# Patient Record
Sex: FEMALE | Race: WHITE | NOT HISPANIC OR LATINO | Employment: FULL TIME | URBAN - METROPOLITAN AREA
[De-identification: names, ages, dates, MRNs, and addresses within clinical notes are randomized per-mention and may not be internally consistent; named-entity substitution may affect disease eponyms.]

---

## 2017-07-03 ENCOUNTER — LAB REQUISITION (OUTPATIENT)
Dept: LAB | Facility: HOSPITAL | Age: 52
End: 2017-07-03

## 2017-07-03 ENCOUNTER — AMBULATORY SURGICAL CENTER (OUTPATIENT)
Dept: URBAN - METROPOLITAN AREA SURGERY 9 | Facility: SURGERY | Age: 52
End: 2017-07-03
Payer: COMMERCIAL

## 2017-07-03 DIAGNOSIS — K57.30 DIVERTICULOSIS OF LARGE INTESTINE WITHOUT PERFORATION OR ABS: ICD-10-CM

## 2017-07-03 DIAGNOSIS — K29.50 UNSPECIFIED CHRONIC GASTRITIS WITHOUT BLEEDING: ICD-10-CM

## 2017-07-03 DIAGNOSIS — K20.9 ESOPHAGITIS, UNSPECIFIED: ICD-10-CM

## 2017-07-03 DIAGNOSIS — Z80.0 FAMILY HISTORY OF MALIGNANT NEOPLASM OF DIGESTIVE ORGAN: ICD-10-CM

## 2017-07-03 DIAGNOSIS — Z87.19 PERSONAL HISTORY OF OTHER DISEASES OF THE DIGESTIVE SYSTEM: ICD-10-CM

## 2017-07-03 DIAGNOSIS — Z80.0 FAMILY HISTORY OF MALIGNANT NEOPLASM OF DIGESTIVE ORGANS: ICD-10-CM

## 2017-07-03 DIAGNOSIS — Z90.3 ACQUIRED ABSENCE OF STOMACH [PART OF]: ICD-10-CM

## 2017-07-03 PROCEDURE — 43239 EGD BIOPSY SINGLE/MULTIPLE: CPT

## 2017-07-03 PROCEDURE — 45378 DIAGNOSTIC COLONOSCOPY: CPT

## 2017-07-03 PROCEDURE — 88312 SPECIAL STAINS GROUP 1: CPT | Performed by: INTERNAL MEDICINE

## 2017-07-03 PROCEDURE — 88305 TISSUE EXAM BY PATHOLOGIST: CPT | Performed by: INTERNAL MEDICINE

## 2017-07-06 LAB
CYTO UR: NORMAL
LAB AP CASE REPORT: NORMAL
LAB AP CLINICAL INFORMATION: NORMAL
Lab: NORMAL
PATH REPORT.FINAL DX SPEC: NORMAL
PATH REPORT.GROSS SPEC: NORMAL

## 2021-02-12 ENCOUNTER — IMMUNIZATION (OUTPATIENT)
Dept: VACCINE CLINIC | Facility: HOSPITAL | Age: 56
End: 2021-02-12

## 2021-02-12 PROCEDURE — 0001A: CPT | Performed by: INTERNAL MEDICINE

## 2021-02-12 PROCEDURE — 91300 HC SARSCOV02 VAC 30MCG/0.3ML IM: CPT | Performed by: INTERNAL MEDICINE

## 2021-03-05 ENCOUNTER — APPOINTMENT (OUTPATIENT)
Dept: VACCINE CLINIC | Facility: HOSPITAL | Age: 56
End: 2021-03-05

## 2021-03-06 ENCOUNTER — IMMUNIZATION (OUTPATIENT)
Dept: VACCINE CLINIC | Facility: HOSPITAL | Age: 56
End: 2021-03-06

## 2021-03-06 PROCEDURE — 91300 HC SARSCOV02 VAC 30MCG/0.3ML IM: CPT | Performed by: INTERNAL MEDICINE

## 2021-03-06 PROCEDURE — 0002A: CPT | Performed by: INTERNAL MEDICINE

## 2021-09-02 ENCOUNTER — E-VISIT (OUTPATIENT)
Dept: FAMILY MEDICINE CLINIC | Facility: TELEHEALTH | Age: 56
End: 2021-09-02

## 2021-09-02 DIAGNOSIS — Z20.822 EXPOSURE TO COVID-19 VIRUS: Primary | ICD-10-CM

## 2021-09-02 PROCEDURE — 99421 OL DIG E/M SVC 5-10 MIN: CPT | Performed by: NURSE PRACTITIONER

## 2021-09-02 NOTE — PROGRESS NOTES
I reviewed the patient's evisit. Dx exposure to covid-19. I ordered a covid-19 test. I spent 5-10 minutes in the patient's chart for this e-visit.

## 2021-12-01 ENCOUNTER — IMMUNIZATION (OUTPATIENT)
Dept: VACCINE CLINIC | Facility: HOSPITAL | Age: 56
End: 2021-12-01

## 2021-12-01 PROCEDURE — 91300 HC SARSCOV02 VAC 30MCG/0.3ML IM: CPT | Performed by: INTERNAL MEDICINE

## 2021-12-01 PROCEDURE — 0004A HC ADM SARSCOV2 30MCG/0.3ML BOOSTER: CPT | Performed by: INTERNAL MEDICINE

## 2023-12-05 ENCOUNTER — HOSPITAL ENCOUNTER (OUTPATIENT)
Dept: GENERAL RADIOLOGY | Facility: HOSPITAL | Age: 58
Discharge: HOME | End: 2023-12-05
Admitting: NURSE PRACTITIONER
Payer: COMMERCIAL

## 2023-12-05 ENCOUNTER — OFFICE VISIT (OUTPATIENT)
Dept: INTERNAL MEDICINE | Facility: CLINIC | Age: 58
End: 2023-12-05
Payer: COMMERCIAL

## 2023-12-05 VITALS
OXYGEN SATURATION: 97 % | TEMPERATURE: 98.2 F | BODY MASS INDEX: 38.39 KG/M2 | DIASTOLIC BLOOD PRESSURE: 80 MMHG | WEIGHT: 230.4 LBS | HEART RATE: 96 BPM | SYSTOLIC BLOOD PRESSURE: 110 MMHG | HEIGHT: 65 IN

## 2023-12-05 DIAGNOSIS — Z12.11 ENCOUNTER FOR SCREENING COLONOSCOPY: ICD-10-CM

## 2023-12-05 DIAGNOSIS — Z12.31 ENCOUNTER FOR SCREENING MAMMOGRAM FOR MALIGNANT NEOPLASM OF BREAST: ICD-10-CM

## 2023-12-05 DIAGNOSIS — R00.2 HEART PALPITATIONS: ICD-10-CM

## 2023-12-05 DIAGNOSIS — E78.5 HYPERLIPIDEMIA LDL GOAL <100: ICD-10-CM

## 2023-12-05 DIAGNOSIS — Z90.3 H/O GASTRIC SLEEVE: ICD-10-CM

## 2023-12-05 DIAGNOSIS — M25.512 ACUTE PAIN OF LEFT SHOULDER: Primary | ICD-10-CM

## 2023-12-05 DIAGNOSIS — Z98.890 HISTORY OF COLONOSCOPY WITH POLYPECTOMY: ICD-10-CM

## 2023-12-05 DIAGNOSIS — Z11.59 NEED FOR HEPATITIS C SCREENING TEST: ICD-10-CM

## 2023-12-05 DIAGNOSIS — K21.00 GASTROESOPHAGEAL REFLUX DISEASE WITH ESOPHAGITIS WITHOUT HEMORRHAGE: ICD-10-CM

## 2023-12-05 DIAGNOSIS — Z86.010 HISTORY OF COLONOSCOPY WITH POLYPECTOMY: ICD-10-CM

## 2023-12-05 DIAGNOSIS — M25.512 ACUTE PAIN OF LEFT SHOULDER: ICD-10-CM

## 2023-12-05 PROBLEM — Z86.0100 HISTORY OF COLONOSCOPY WITH POLYPECTOMY: Status: ACTIVE | Noted: 2023-12-05

## 2023-12-05 PROBLEM — K21.9 GASTROESOPHAGEAL REFLUX DISEASE: Status: ACTIVE | Noted: 2023-12-05

## 2023-12-05 PROCEDURE — 73030 X-RAY EXAM OF SHOULDER: CPT

## 2023-12-05 RX ORDER — DIPHENOXYLATE HYDROCHLORIDE AND ATROPINE SULFATE 2.5; .025 MG/1; MG/1
TABLET ORAL DAILY
COMMUNITY

## 2023-12-05 RX ORDER — ESOMEPRAZOLE MAGNESIUM 40 MG/1
40 CAPSULE, DELAYED RELEASE ORAL
Qty: 90 CAPSULE | Refills: 1 | Status: SHIPPED | OUTPATIENT
Start: 2023-12-05

## 2023-12-05 RX ORDER — ESOMEPRAZOLE MAGNESIUM 40 MG/1
40 CAPSULE, DELAYED RELEASE ORAL
COMMUNITY
Start: 2016-01-04 | End: 2023-12-05 | Stop reason: SDUPTHER

## 2023-12-05 NOTE — PROGRESS NOTES
"Chief Complaint   Patient presents with    Establish Care    Shoulder Pain     Discomfort in left shoulder       Subjective     Ashlyn Gil is a 58 y.o. female being seen for a re-establish care appointment to discuss palpitations, left shoulder pain, and GERD. She was last seen here in 2014. She has hx of GERD. She had a gastric sleeve procedure in 12- with Dr. Burleson. She has been taking OTC omeprazole 20mg due to running out of nexium, as she is due an EGD and coloscopy with Dr. Alonso.    She is having left shoulder pain for 4 weeks.Described as burning pain, worse with lifting. Radiating to left breast. No known injury, but recently decorated for the holidays and lifted boxes.She is taking Tylenol as needed.     She has felt palpitations, like fluttering of heart for the past 42 weeks. She captured an ECG on her watch. Described as a \"whoosh\" feeling.Denies SOA, CP. This occurs a couple times a week.     History of Present Illness     No Known Allergies      Current Outpatient Medications:     esomeprazole (nexIUM) 40 MG capsule, Take 1 capsule by mouth., Disp: , Rfl:     multivitamin (MULTIVITAMIN PO), Take  by mouth Daily., Disp: , Rfl:     The following portions of the patient's history were reviewed and updated as appropriate: allergies, current medications, past family history, past medical history, past social history, past surgical history, and problem list.    Review of Systems   Constitutional: Negative.    HENT: Negative.     Eyes: Negative.    Respiratory: Negative.     Cardiovascular: Negative.  Negative for chest pain, palpitations and leg swelling.   Gastrointestinal: Negative.    Endocrine: Negative.    Genitourinary: Negative.    Musculoskeletal:  Positive for arthralgias.   Allergic/Immunologic: Negative.  Negative for environmental allergies.   Neurological: Negative.    Hematological: Negative.  Negative for adenopathy. Does not bruise/bleed easily.   Psychiatric/Behavioral: " Negative.     All other systems reviewed and are negative.      Assessment     Physical Exam  Vitals reviewed.   Constitutional:       Appearance: Normal appearance.   HENT:      Head: Normocephalic.      Right Ear: Tympanic membrane normal.   Cardiovascular:      Rate and Rhythm: Normal rate and regular rhythm.      Pulses: Normal pulses.      Heart sounds: Normal heart sounds. No murmur heard.  Pulmonary:      Effort: Pulmonary effort is normal. No respiratory distress.      Breath sounds: Normal breath sounds. No stridor.   Musculoskeletal:      Left shoulder: Crepitus present. No tenderness. Normal range of motion. Normal strength. Normal pulse.      Comments: Pain left shoulder with internal and external rotation   Neurological:      General: No focal deficit present.      Mental Status: She is alert and oriented to person, place, and time.      Gait: Gait normal.   Psychiatric:         Mood and Affect: Mood normal.         Behavior: Behavior normal.         Thought Content: Thought content normal.         Plan     Her ECG tracings reviewed from SmartWatch, no evidence of A fib.    Diagnoses and all orders for this visit:    1. Acute pain of left shoulder (Primary)  -     XR Shoulder 2+ View Left; Future  -     Ambulatory Referral to Physical Therapy Evaluate and treat    2. Gastroesophageal reflux disease with esophagitis without hemorrhage  -     Ambulatory Referral to Gastroenterology  -     CBC & Differential  -     Comprehensive Metabolic Panel  -     esomeprazole (nexIUM) 40 MG capsule; Take 1 capsule by mouth Every Morning Before Breakfast.  Dispense: 90 capsule; Refill: 1    3. Hyperlipidemia LDL goal <100  -     CBC & Differential  -     Comprehensive Metabolic Panel  -     Lipid Panel With / Chol / HDL Ratio    4. Heart palpitations  -     Lipid Panel With / Chol / HDL Ratio  -     TSH    5. H/O gastric sleeve  -     Vitamin B12  -     Vitamin B1, Whole Blood; Future  -     Ferritin    6. History of  colonoscopy with polypectomy  -     Ambulatory Referral to Gastroenterology    7. Encounter for screening colonoscopy  -     Ambulatory Referral to Gastroenterology    8. Encounter for screening mammogram for malignant neoplasm of breast  -     Mammo Screening Digital Tomosynthesis Bilateral With CAD; Future    9. Need for hepatitis C screening test  -     Hepatitis C antibody        Follow up in 4 weeks for CPE and PAP

## 2023-12-06 DIAGNOSIS — R73.01 IFG (IMPAIRED FASTING GLUCOSE): Primary | ICD-10-CM

## 2023-12-06 LAB
ALBUMIN SERPL-MCNC: 4.7 G/DL (ref 3.5–5.2)
ALBUMIN/GLOB SERPL: 1.7 G/DL
ALP SERPL-CCNC: 69 U/L (ref 39–117)
ALT SERPL-CCNC: 24 U/L (ref 1–33)
AST SERPL-CCNC: 22 U/L (ref 1–32)
BASOPHILS # BLD AUTO: 0.02 10*3/MM3 (ref 0–0.2)
BASOPHILS NFR BLD AUTO: 0.3 % (ref 0–1.5)
BILIRUB SERPL-MCNC: 0.4 MG/DL (ref 0–1.2)
BUN SERPL-MCNC: 15 MG/DL (ref 8–23)
BUN/CREAT SERPL: 19.2 (ref 7–25)
CALCIUM SERPL-MCNC: 9.9 MG/DL (ref 8.6–10.5)
CHLORIDE SERPL-SCNC: 102 MMOL/L (ref 98–107)
CHOLEST SERPL-MCNC: 256 MG/DL (ref 0–200)
CHOLEST/HDLC SERPL: 2.75 {RATIO}
CO2 SERPL-SCNC: 23.5 MMOL/L (ref 22–29)
CREAT SERPL-MCNC: 0.78 MG/DL (ref 0.57–1)
EGFRCR SERPLBLD CKD-EPI 2021: 87.1 ML/MIN/1.73
EOSINOPHIL # BLD AUTO: 0.08 10*3/MM3 (ref 0–0.4)
EOSINOPHIL NFR BLD AUTO: 1.2 % (ref 0.3–6.2)
ERYTHROCYTE [DISTWIDTH] IN BLOOD BY AUTOMATED COUNT: 12.4 % (ref 12.3–15.4)
FERRITIN SERPL-MCNC: 46.3 NG/ML (ref 13–150)
GLOBULIN SER CALC-MCNC: 2.7 GM/DL
GLUCOSE SERPL-MCNC: 112 MG/DL (ref 65–99)
HCT VFR BLD AUTO: 43.5 % (ref 34–46.6)
HCV IGG SERPL QL IA: NON REACTIVE
HDLC SERPL-MCNC: 93 MG/DL (ref 40–60)
HGB BLD-MCNC: 14 G/DL (ref 12–15.9)
IMM GRANULOCYTES # BLD AUTO: 0.02 10*3/MM3 (ref 0–0.05)
IMM GRANULOCYTES NFR BLD AUTO: 0.3 % (ref 0–0.5)
LDLC SERPL CALC-MCNC: 151 MG/DL (ref 0–100)
LYMPHOCYTES # BLD AUTO: 2.61 10*3/MM3 (ref 0.7–3.1)
LYMPHOCYTES NFR BLD AUTO: 39.7 % (ref 19.6–45.3)
MCH RBC QN AUTO: 26.4 PG (ref 26.6–33)
MCHC RBC AUTO-ENTMCNC: 32.2 G/DL (ref 31.5–35.7)
MCV RBC AUTO: 81.9 FL (ref 79–97)
MONOCYTES # BLD AUTO: 0.45 10*3/MM3 (ref 0.1–0.9)
MONOCYTES NFR BLD AUTO: 6.8 % (ref 5–12)
NEUTROPHILS # BLD AUTO: 3.39 10*3/MM3 (ref 1.7–7)
NEUTROPHILS NFR BLD AUTO: 51.7 % (ref 42.7–76)
NRBC BLD AUTO-RTO: 0.2 /100 WBC (ref 0–0.2)
PLATELET # BLD AUTO: 307 10*3/MM3 (ref 140–450)
POTASSIUM SERPL-SCNC: 4.1 MMOL/L (ref 3.5–5.2)
PROT SERPL-MCNC: 7.4 G/DL (ref 6–8.5)
RBC # BLD AUTO: 5.31 10*6/MM3 (ref 3.77–5.28)
SODIUM SERPL-SCNC: 140 MMOL/L (ref 136–145)
TRIGL SERPL-MCNC: 73 MG/DL (ref 0–150)
TSH SERPL DL<=0.005 MIU/L-ACNC: 2.13 UIU/ML (ref 0.27–4.2)
VIT B12 SERPL-MCNC: 658 PG/ML (ref 211–946)
VLDLC SERPL CALC-MCNC: 12 MG/DL (ref 5–40)
WBC # BLD AUTO: 6.57 10*3/MM3 (ref 3.4–10.8)

## 2023-12-07 ENCOUNTER — PATIENT ROUNDING (BHMG ONLY) (OUTPATIENT)
Dept: INTERNAL MEDICINE | Facility: CLINIC | Age: 58
End: 2023-12-07
Payer: COMMERCIAL

## 2023-12-07 ENCOUNTER — TELEPHONE (OUTPATIENT)
Dept: INTERNAL MEDICINE | Facility: CLINIC | Age: 58
End: 2023-12-07
Payer: COMMERCIAL

## 2023-12-07 LAB
HBA1C MFR BLD: 5.6 % (ref 4.8–5.6)
WRITTEN AUTHORIZATION: NORMAL

## 2023-12-07 NOTE — TELEPHONE ENCOUNTER
Lvm to call back    Relay  Labs do not show any anemia or leukemias She is not a hep C carrier. Fasting sugar is 112. Total cholesterol 256, < 200 is normal and , goal < 100. I would like to discuss a weight loss medicine with her at physical to help reduce her risk for DM and CAD. Please have her check insurance benefit for Wegovy and Zepbound.

## 2023-12-07 NOTE — PROGRESS NOTES
My name is Rebecca Jarrett and I am the Patient  at [facility].    I would like to officially welcome you to our practice and ask about your recent visit.    Tell me about your visit with us. What things went well?        We're always looking for ways to make our patients' experiences even better. Do you have recommendations on ways we may improve?     Overall were you satisfied with your first visit to our practice?       I appreciate you taking the time to answer these questions. Is there anything else I can do for you?      Thank you, and have a great day.    Rebecca

## 2023-12-07 NOTE — TELEPHONE ENCOUNTER
Name: Ashlyn Gil    Relationship: Self    Best Callback Number: [phone]    HUB PROVIDED THE RELAY MESSAGE FROM THE OFFICE   PATIENT VOICED UNDERSTANDING AND HAS NO FURTHER QUESTIONS AT THIS TIME    ADDITIONAL INFORMATION: STATED THAT THEY WILL REACH OUT TO INSURANCE. STATED THAT THEY NOTICED AN A1C ON THEIR MYCHART AND WANTED TO MAKE SURE IF THEY SHOULD GET THIS NOW OR WAIT TIL THE PHYSICAL. PLEASE CALL AND ADVISE

## 2023-12-18 ENCOUNTER — HOSPITAL ENCOUNTER (OUTPATIENT)
Dept: PHYSICAL THERAPY | Facility: HOSPITAL | Age: 58
Setting detail: THERAPIES SERIES
Discharge: HOME | End: 2023-12-18
Payer: COMMERCIAL

## 2023-12-18 DIAGNOSIS — M25.512 ACUTE PAIN OF LEFT SHOULDER: Primary | ICD-10-CM

## 2023-12-18 PROCEDURE — 97161 PT EVAL LOW COMPLEX 20 MIN: CPT | Performed by: PHYSICAL THERAPIST

## 2023-12-19 NOTE — THERAPY EVALUATION
Outpatient Physical Therapy Ortho Initial Evaluation  NABIL Natarajan     Patient Name: Ashlyn Gil  : 1965  MRN: [id number]  Today's Date: 2023      Visit Date: 2023    Patient Active Problem List   Diagnosis    Gastroesophageal reflux disease    Total, mature senile cataract    Hyperlipidemia LDL goal <100    History of colonoscopy with polypectomy    Encounter for screening mammogram for malignant neoplasm of breast    Acute pain of left shoulder    Need for hepatitis C screening test        Past Medical History:   Diagnosis Date    Anxiety     Carpal tunnel syndrome     Depression     Diabetes mellitus, type 2     Diarrhea     Edema     Esophageal reflux     Esophagitis, reflux     Fatigue     Heartburn     Hemorrhoids     Hiatal hernia     History of bone density study 2010    Normal    History of colonoscopy 2012    Normal    History of esophagogastroduodenoscopy 2012    Olman Esophagus    History of mammogram 2012    Normal    Hypercholesterolemia     Hyperlipidemia     Insomnia     Malnutrition     Morbid obesity     Pain in soft tissues of limb     Knee    Plantar fasciitis     Poor vision     Prediabetes     Restless legs syndrome     Right upper quadrant pain     Sciatica     Sleep apnea     Wears glasses     Wrist joint pain     Bilateral        Past Surgical History:   Procedure Laterality Date    BILATERAL BREAST REDUCTION  2010    CHOLECYSTECTOMY  2012    EYE SURGERY      GASTRIC RESTRICTION SURGERY  2013    VGS, VERTICAL GASTRIC SLEEVE    HERNIA REPAIR  2013    Paraesophageal Hiatus Hernia    LEG SURGERY  2010    RIGHT LEG, ROSI AND SCREWS    PAP SMEAR  2012    Normal    TONSILLECTOMY AND ADENOIDECTOMY  1970    TUBAL ABDOMINAL LIGATION  1995       Visit Dx:     ICD-10-CM ICD-9-CM   1. Acute pain of left shoulder  M25.512 719.41          Patient History       Row Name 23 0700             History    Chief Complaint Muscle  tenderness;Numbness;Pain  -GC      Type of Pain Shoulder pain;Upper Extremity / Arm  -GC      Date Current Problem(s) Began 12/04/23  -GC      Brief Description of Current Complaint Pt reports a severl wekk hsitory of left shoulder and left UE pain, numbness, tingling with no specific injury. She denies any neck pain or issues and denies any previous shoulder or neck problems.She was seen by MARVA Haddad who did x-rays which were negative. She has been referred for therapy.  -GC      Patient/Caregiver Goals Relieve pain  -GC      Hand Dominance right-handed  -GC      Occupation/sports/leisure activities Walking  -      What clinical tests have you had for this problem? X-ray  -      Results of Clinical Tests negative  -GC      Are you or can you be pregnant No  -GC         Pain     Pain Location Arm;Shoulder  left shoulder an UE  -GC      Pain at Present 3  -GC      Pain at Worst 6  -GC      Pain Frequency Constant/continuous  -GC      Pain Description Aching;Burning;Discomfort;Tingling;Tightness;Numbness  -      What Performance Factors Make the Current Problem(s) WORSE? Pt c/o pain when lyning on her left side  -GC         Fall Risk Assessment    Any falls in the past year: No  -GC         Services    Prior Rehab/Home Health Experiences No  -GC      Are you currently receiving Home Health services No  -GC      Do you plan to receive Home Health services in the near future No  -GC         Daily Activities    Primary Language English  -GC      Are you able to read Yes  -GC      Are you able to write Yes  -GC      How does patient learn best? Listening;Reading;Demonstration  -GC      Teaching needs identified Home Exercise Program;Management of Condition  -GC      Patient is concerned about/has problems with Performing home management (household chores, shopping, care of dependents);Performing job responsibilities/community activities (work, school,;Repetitive movements of the hand, arm, shoulder  -GC       Does patient have problems with the following? Anxiety  -GC      Barriers to learning None  -GC      Pt Participated in POC and Goals Yes  -GC         Safety    Are you being hurt, hit, or frightened by anyone at home or in your life? No  -GC      Are you being neglected by a caregiver No  -GC      Have you had any of the following issues with N/A  -GC                User Key  (r) = Recorded By, (t) = Taken By, (c) = Cosigned By      Initials Name Provider Type    GC Immanuel Briggs PT Physical Therapist                     PT Ortho       Row Name 12/18/23 0700       Posture/Observations    Posture/Observations Comments Pt has mild rounded shoulders and slight forward head  -GC       Neural Tension Signs- Upper Quarter Clearing    ULNTT 1 Left:;Postive  -GC    ULNTT 2 Left:;Postive  -GC    ULNTT 3 Left:;Postive  -GC    ULNTT 4 Left:;Postive  -GC       Cervical Palpation    Levator Scapula Left:;Tender  -GC    Upper Traps Left:;Tender  -GC       Rib Mobility    Rib Mobility- T1 Left:;Hypomobile  elevated 1st rib left  -GC       Shoulder Girdle Accessory Motions    Posterior glide of humerus Left:;WNL  -GC    Anterior glide of humerus Left:;WNL  -GC    Inferior glide of humerus Left:;WNL  -GC       Shoulder Impingement/Rotator Cuff Special Tests    Duenas-Karl Test (RC Lesion vs. Bursitis) Left:;Negative  -GC    Neer Impingement Test (RC Lesion vs. Bursitis) Left:;Negative  -GC    Drop Arm Test (Full Thickness RC Lesion) Left:;Negative  -GC       Shoulder Laxity/Instability Special Tests    Load and Shift Test Left:;Negative  -GC    Sulcus Sign, 0 Degrees Left:;Negative  -GC       Biceps/Labral Special Tests    [place]'s Test (Labral Test) Left:;Negative  -GC       Left Upper Ext    Lt Shoulder Abduction AROM 160 degrees  -GC    Lt Shoulder Flexion AROM 149 degrees  -GC    Lt Shoulder External Rotation AROM WFL  -GC    Lt Shoulder Internal Rotation AROM 65 degrees  -GC       MMT (Manual Muscle Testing)    General  MMT Comments scaption strength is 4+/5  -       MMT Left Upper Ext    Lt Shoulder Flexion MMT, Gross Movement (4+/5) good plus  -GC    Lt Shoulder Extension MMT, Gross Movement (5/5) normal  -GC    Lt Shoulder ABduction MMT, Gross Movement (4+/5) good plus  -GC    Lt Shoulder ADduction MMT, Gross Movement (5/5) normal  -GC    Lt Shoulder Internal Rotation MMT, Gross Movement (5/5) normal  -GC    Lt Shoulder External Rotation MMT, Gross Movement (4+/5) good plus  -GC       Sensation    Light Touch No apparent deficits  -GC       Upper Extremity Flexibility    Scalenes Left:;Mildly limited  -GC    SCM Left:;WNL  -GC    Upper Trapezius Left:;Mildly limited  -GC    Levator Scapula Left:;Mildly limited  -GC              User Key  (r) = Recorded By, (t) = Taken By, (c) = Cosigned By      Initials Name Provider Type    Immanuel Roberts, PT Physical Therapist                                Therapy Education  Given: HEP, Symptoms/condition management, Pain management, Posture/body mechanics  Program: New  How Provided: Verbal, Demonstration, Written  Provided to: Patient  Level of Understanding: Teach back education performed, Verbalized, Demonstrated      PT OP Goals       Row Name 12/18/23 0700          PT Short Term Goals    STG Date to Achieve 01/01/24  -     STG 1 Decrease left shoulder pain to 2-3/10 with activity.  -     STG 2 Increase left shoulder AROM to WFL all planes with testing.  -     STG 3 Improve left first rib position to WNL with testing.  -     STG 4 Pt will be independent with her HEP issued by this therapist.  -        Long Term Goals    LTG Date to Achieve 01/15/24  -     LTG 1 Decrease left shoulder pain to 0-1/10 with activity.  -     LTG 2 Increase upper trap, levator, and scalene flexibility to WFL with testing.  -     LTG 3 Increase left shoulder girdle strength to 5/5 all planes with testing.  -     LTG 4 Pt will be independent with all ADLs without pain.  -        Time  Calculation    PT Goal Re-Cert Due Date 01/15/24  -               User Key  (r) = Recorded By, (t) = Taken By, (c) = Cosigned By      Initials Name Provider Type     Immanuel Briggs, PT Physical Therapist                     PT Assessment/Plan       Row Name 12/18/23 0700          PT Assessment    Functional Limitations Limitation in home management;Limitations in community activities;Limitations in functional capacity and performance;Performance in leisure activities  -     Impairments Range of motion;Pain;Muscle strength;Joint mobility  -     Assessment Comments Pt presents with a several week history of left shoulder pain with left UE numbness and tingling. She rates her pain up to 6/10 with activity. She has positive upper limb neural tension tests and positive thoracic outlet tests with an elevated first rib. she has decreased left shoulder ROM, decreased left shoulder girdle strength, and decreased function secondary to the above.  -     Rehab Potential Good  -     Patient/caregiver participated in establishment of treatment plan and goals Yes  -     Patient would benefit from skilled therapy intervention Yes  -        PT Plan    PT Frequency 1x/week;2x/week  -     Predicted Duration of Therapy Intervention (PT) 4 weeks  -     Planned CPT's? PT EVAL LOW COMPLEXITY: 29710;PT THER PROC EA 15 MIN: 50608;PT MANUAL THERAPY EA 15 MIN: 17430;PT HOT OR COLD PACK TREAT MCARE;PT TRACTION CERVICAL: 64848  -     PT Plan Comments Pt is to continue her HEP 2x daily  -               User Key  (r) = Recorded By, (t) = Taken By, (c) = Cosigned By      Initials Name Provider Type     Immanuel Briggs, PT Physical Therapist                     Modalities       Row Name 12/18/23 0700             Moist Heat    MH Applied Yes  -      Location left shoulder with pt supine and roll under knees  -      PT Moist Heat Minutes 10  -GC      MH Prior to Rx Yes  -                User Key  (r) = Recorded By, (t)  = Taken By, (c) = Cosigned By      Initials Name Provider Type    GC Immanuel Briggs, PT Physical Therapist                   OP Exercises       Row Name 12/18/23 0700             Exercise 1    Exercise Name 1 Left upper trap stretch  -GC      Reps 1 10  -GC      Time 1 10 secs  -GC         Exercise 2    Exercise Name 2 Left levator stretch  -GC      Reps 2 10  -GC      Time 2 10 secs  -GC         Exercise 3    Exercise Name 3 Left scalene stretch  -GC      Reps 3 10  -GC      Time 3 10 secs  -GC         Exercise 4    Exercise Name 4 1st rib mobilizations  -GC      Reps 4 15x  -GC                User Key  (r) = Recorded By, (t) = Taken By, (c) = Cosigned By      Initials Name Provider Type    GC Immanuel Briggs, PT Physical Therapist                  Manual Rx (last 36 hours)       Manual Treatments       Row Name 12/18/23 0700             Manual Rx 1    Manual Rx 1 Location cervical spine  -GC      Manual Rx 1 Type left upper trap stretch  -GC      Manual Rx 1 Duration 3x30 secs  -GC         Manual Rx 2    Manual Rx 2 Location cervical spine  -GC      Manual Rx 2 Type left levator stretch  -GC      Manual Rx 2 Duration 3x30 secs  -GC         Manual Rx 3    Manual Rx 3 Location cervical spine  -GC      Manual Rx 3 Type left scalene stretch  -GC      Manual Rx 3 Duration 3x30 secs  -GC         Manual Rx 4    Manual Rx 4 Location cervical spine  -GC      Manual Rx 4 Type 1st rib mobilization  -GC      Manual Rx 4 Duration 5 min  -GC                User Key  (r) = Recorded By, (t) = Taken By, (c) = Cosigned By      Initials Name Provider Type    GC Immanuel Briggs, PT Physical Therapist                                Outcome Measure Options: Quick DASH  Quick DASH  Open a tight or new jar.: No Difficulty  Do heavy household chores (e.g., wash walls, wash floors): No Difficulty  Carry a shopping bag or briefcase: No Difficulty  Wash your back: Mild Difficulty  Use a knife to cut food: No Difficulty  Recreational activities  in which you take some force or impact through your arm, should or hand (e.g. golf, hammering, tennis, etc.): Mild Difficulty  During the past week, to what extent has your arm, shoulder, or hand problem interfered with your normal social activites with family, friends, neighbors or groups?: Slightly  During the past week, were you limited in your work or other regular daily activities as a result of your arm, shoulder or hand problem?: Slightly Limited  Tingling (pins and needles) in your arm, shoulder, or hand: Mild  During the past week, how much difficulty have you had sleeping because of the pain in your arm, shoulder or hand?: Mild Difficulty  Number of Questions Answered: 10  Quick DASH Score: 15         Time Calculation:     Start Time: 0700  Stop Time: 0800  Time Calculation (min): 60 min  Untimed Charges  PT Moist Heat Minutes: 10  Total Minutes  Untimed Charges Total Minutes: 10   Total Minutes: 10     Therapy Charges for Today       Code Description Service Date Service Provider Modifiers Qty    38832627066 HC PT EVAL LOW COMPLEXITY 3 12/18/2023 Immanuel Briggs, PT GP 1            PT G-Codes  Outcome Measure Options: Quick DASH  Quick DASH Score: 15         Immanuel Briggs, PT  12/19/2023

## 2023-12-22 ENCOUNTER — HOSPITAL ENCOUNTER (OUTPATIENT)
Dept: PHYSICAL THERAPY | Facility: HOSPITAL | Age: 58
Setting detail: THERAPIES SERIES
Discharge: HOME | End: 2023-12-22
Payer: COMMERCIAL

## 2023-12-22 DIAGNOSIS — M25.512 ACUTE PAIN OF LEFT SHOULDER: Primary | ICD-10-CM

## 2023-12-22 PROCEDURE — 97110 THERAPEUTIC EXERCISES: CPT | Performed by: PHYSICAL THERAPIST

## 2023-12-22 NOTE — THERAPY TREATMENT NOTE
Outpatient Physical Therapy Ortho Treatment Note  NABIL Natarajan     Patient Name: Ashlyn Gil  : 1965  MRN: [id number]  Today's Date: 2023      Visit Date: 2023    Visit Dx:    ICD-10-CM ICD-9-CM   1. Acute pain of left shoulder  M25.512 719.41       Patient Active Problem List   Diagnosis    Gastroesophageal reflux disease    Total, mature senile cataract    Hyperlipidemia LDL goal <100    History of colonoscopy with polypectomy    Encounter for screening mammogram for malignant neoplasm of breast    Acute pain of left shoulder    Need for hepatitis C screening test        Past Medical History:   Diagnosis Date    Anxiety     Carpal tunnel syndrome     Depression     Diabetes mellitus, type 2     Diarrhea     Edema     Esophageal reflux     Esophagitis, reflux     Fatigue     Heartburn     Hemorrhoids     Hiatal hernia     History of bone density study     Normal    History of colonoscopy 2012    Normal    History of esophagogastroduodenoscopy 2012    Olman Esophagus    History of mammogram 2012    Normal    Hypercholesterolemia     Hyperlipidemia     Insomnia     Malnutrition     Morbid obesity     Pain in soft tissues of limb     Knee    Plantar fasciitis     Poor vision     Prediabetes     Restless legs syndrome     Right upper quadrant pain     Sciatica     Sleep apnea     Wears glasses     Wrist joint pain     Bilateral        Past Surgical History:   Procedure Laterality Date    BILATERAL BREAST REDUCTION  2010    CHOLECYSTECTOMY  2012    EYE SURGERY      GASTRIC RESTRICTION SURGERY  2013    VGS, VERTICAL GASTRIC SLEEVE    HERNIA REPAIR  2013    Paraesophageal Hiatus Hernia    LEG SURGERY  2010    RIGHT LEG, ROSI AND SCREWS    PAP SMEAR  2012    Normal    TONSILLECTOMY AND ADENOIDECTOMY  1970    TUBAL ABDOMINAL LIGATION  1995                        PT Assessment/Plan       Row Name 23 0700          PT Assessment    Assessment  Comments Pt is doing well with decreasd c/o pain and improved function.  -GC        PT Plan    PT Plan Comments Pt is to continue her HEP 2x daily.  -GC               User Key  (r) = Recorded By, (t) = Taken By, (c) = Cosigned By      Initials Name Provider Type    Immanuel Roberts PT Physical Therapist                     Modalities       Row Name 12/22/23 0700             Subjective    Subjective Comments Pt states the symptoms in her left arm are much better already.  -GC         Moist Heat    MH Applied Yes  -GC      Location left shoulder with pt supine and roll under knees  -GC      PT Moist Heat Minutes 10  -GC      MH Prior to Rx Yes  -GC         Functional Testing    Outcome Measure Options Quick DASH  -GC                User Key  (r) = Recorded By, (t) = Taken By, (c) = Cosigned By      Initials Name Provider Type    Immanuel Roberts, PT Physical Therapist                   OP Exercises       Row Name 12/22/23 0700             Subjective    Subjective Comments Pt states the symptoms in her left arm are much better already.  -GC         Exercise 1    Exercise Name 1 Left upper trap stretch  -GC      Reps 1 10  -GC      Time 1 10 secs  -GC         Exercise 2    Exercise Name 2 Left levator stretch  -GC      Reps 2 10  -GC      Time 2 10 secs  -GC         Exercise 3    Exercise Name 3 Left scalene stretch  -GC      Reps 3 10  -GC      Time 3 10 secs  -GC         Exercise 4    Exercise Name 4 1st rib mobilizations  -GC      Reps 4 15x  -GC         Exercise 5    Exercise Name 5 ULNT mobilizations fo r all 4 biases  -GC      Time 5 5 min  -GC                User Key  (r) = Recorded By, (t) = Taken By, (c) = Cosigned By      Initials Name Provider Type    GC Immanuel Briggs, PT Physical Therapist                                          Outcome Measure Options: Quick DASH         Time Calculation:   Start Time: 0700  Stop Time: 0731  Time Calculation (min): 31 min  Untimed Charges  PT Moist Heat Minutes:  10  Total Minutes  Untimed Charges Total Minutes: 10   Total Minutes: 10  Therapy Charges for Today       Code Description Service Date Service Provider Modifiers Qty    75843292753 HC PT THER PROC EA 15 MIN 12/22/2023 Immanuel Briggs, PT GP 1            PT G-Codes  Outcome Measure Options: Rupinder Briggs, PT  12/22/2023

## 2023-12-26 ENCOUNTER — APPOINTMENT (OUTPATIENT)
Dept: PHYSICAL THERAPY | Facility: HOSPITAL | Age: 58
End: 2023-12-26
Payer: COMMERCIAL

## 2024-01-03 ENCOUNTER — OFFICE VISIT (OUTPATIENT)
Dept: INTERNAL MEDICINE | Facility: CLINIC | Age: 59
End: 2024-01-03
Payer: COMMERCIAL

## 2024-01-03 VITALS
TEMPERATURE: 98 F | WEIGHT: 232.6 LBS | OXYGEN SATURATION: 99 % | HEIGHT: 65 IN | SYSTOLIC BLOOD PRESSURE: 120 MMHG | DIASTOLIC BLOOD PRESSURE: 76 MMHG | HEART RATE: 86 BPM | BODY MASS INDEX: 38.75 KG/M2

## 2024-01-03 DIAGNOSIS — Z12.4 PAP SMEAR FOR CERVICAL CANCER SCREENING: ICD-10-CM

## 2024-01-03 DIAGNOSIS — E78.5 HYPERLIPIDEMIA LDL GOAL <100: ICD-10-CM

## 2024-01-03 DIAGNOSIS — Z00.00 ANNUAL PHYSICAL EXAM: Primary | ICD-10-CM

## 2024-01-03 DIAGNOSIS — F13.982 ANXIOLYTIC-INDUCED INSOMNIA: ICD-10-CM

## 2024-01-03 DIAGNOSIS — E66.01 MORBID (SEVERE) OBESITY DUE TO EXCESS CALORIES: ICD-10-CM

## 2024-01-03 RX ORDER — SEMAGLUTIDE 0.25 MG/.5ML
0.25 INJECTION, SOLUTION SUBCUTANEOUS WEEKLY
Qty: 2 ML | Refills: 1 | Status: SHIPPED | OUTPATIENT
Start: 2024-01-03

## 2024-01-03 RX ORDER — HYDROXYZINE HYDROCHLORIDE 10 MG/1
10 TABLET, FILM COATED ORAL NIGHTLY PRN
Qty: 30 TABLET | Refills: 0 | Status: SHIPPED | OUTPATIENT
Start: 2024-01-03

## 2024-01-03 NOTE — PROGRESS NOTES
Annual Exam        Ashlyn Gil is being seen for a Complete physical exam. Her last physical was unknown.     Social: She is was  October 2021 She is working full time in IT for [employer].    Lifestyle: She does not and never used tobacco. She drinks 1 alcoholic drinks per week. She exercises 0 times a week.    Screening: Colonoscopy was completed never, scheduled for GI tomorrow, and will get colonoscopy and EGD. Last labs reviewed from 12-5-2023.      Reproductive Health: Her Last Pap smear was unknown. Last menstrual period was early 40s, exact unknown. P 3  DEXA scan complete never. Last Mammogram was was > 1 year ago, schedule tomorrow    Dental exam is up to date, scheduled in the fall 2024. Eye exam was completed this year, wears glasses.     She has concerns about both her weight and her anxiety. She has had weight issues since a teenager, and has been > 50 pounds overweight since early 20s. She had a gastric sleeve 12-9-2013. She tried an failed keto, WW, nutri-System, and calori count.     She had a panic attack while awaiting for her daughter's gastric sleeve surgery, and was unable to sleep. It was the first time she has spent in the hospital since losing her .     History of Present Illness     The following portions of the patient's history were reviewed and updated as appropriate: allergies, current medications, past family history, past medical history, past social history, past surgical history, and problem list.    Review of Systems   Constitutional: Negative.    HENT: Negative.     Eyes: Negative.    Respiratory: Negative.     Cardiovascular: Negative.  Negative for chest pain, palpitations and leg swelling.   Gastrointestinal: Negative.    Endocrine: Negative.    Genitourinary: Negative.    Musculoskeletal: Negative.    Allergic/Immunologic: Negative.  Negative for environmental allergies and food allergies.   Neurological: Negative.    Hematological: Negative.     Psychiatric/Behavioral:  Positive for sleep disturbance. The patient is nervous/anxious.    All other systems reviewed and are negative.      Objective       General Appearance:    Alert, cooperative, no distress, appears stated age   Head:    Normocephalic, without obvious abnormality, atraumatic   Eyes:    PERRL, conjunctiva/corneas clear, EOM's intact, fundi     benign, both eyes   Ears:    Normal TM's and external ear canals, both ears   Nose:   Nares normal, septum midline, mucosa normal, no drainage     or sinus tenderness   Throat:   Lips, mucosa, and tongue normal; teeth and gums normal   Neck:   Supple, symmetrical, trachea midline, no adenopathy;     thyroid:  no enlargement/tenderness/nodules; no carotid    bruit or JVD   Back:     Symmetric, no curvature, ROM normal, no CVA tenderness   Lungs:     Clear to auscultation bilaterally, respirations unlabored   Chest Wall:    No tenderness or deformity    Heart:    Regular rate and rhythm, S1 and S2 normal, no murmur, rub    or gallop   Breast Exam:    No tenderness, masses, or nipple abnormality, Bilateral scarring related to previous reduciton   Abdomen:     Soft, non-tender, bowel sounds active all four quadrants,     no masses, no organomegaly   Genitalia:    Normal female without lesion, discharge or tenderness   Rectal:    Normal tone, no masses or tenderness   Extremities:   Extremities normal, atraumatic, no cyanosis or edema   Pulses:   2+ and symmetric all extremities   Skin:   Skin color, texture, turgor normal, no rashes or lesions   Lymph nodes:   Cervical, supraclavicular, and axillary nodes normal   Neurologic:   CNII-XII intact, normal strength, sensation and reflexes     throughout               Assessment & Plan   Diagnoses and all orders for this visit:    1. Annual physical exam (Primary)    2. Pap smear for cervical cancer screening  -     IgP, Aptima HPV    3. Hyperlipidemia LDL goal <100  Comments:  LDL poorly controlled, will work on  weight loss w Wegovy  Orders:  -     Comprehensive Metabolic Panel; Future  -     Lipid Panel With / Chol / HDL Ratio; Future    4. Morbid (severe) obesity due to excess calories  Comments:  BMI 38.7  Orders:  -     Semaglutide-Weight Management (Wegovy) 0.25 MG/0.5ML solution auto-injector; Inject 0.25 mg under the skin into the appropriate area as directed 1 (One) Time Per Week.  Dispense: 2 mL; Refill: 1    5. Anxiolytic-induced insomnia  -     hydrOXYzine (ATARAX) 10 MG tablet; Take 1 tablet by mouth At Night As Needed for Anxiety.  Dispense: 30 tablet; Refill: 0        Preventive counseling: Inadequate exercise regimen. Reviewed vaccines, Shingrix recommended. Reviewed cancer screening recommendations. Mammogram and GI appt scheduled.    She will follow up at next scheduled appointment in 4 weeks.

## 2024-01-04 ENCOUNTER — HOSPITAL ENCOUNTER (OUTPATIENT)
Dept: MAMMOGRAPHY | Facility: HOSPITAL | Age: 59
Discharge: HOME | End: 2024-01-04
Admitting: NURSE PRACTITIONER
Payer: COMMERCIAL

## 2024-01-04 ENCOUNTER — OFFICE VISIT (OUTPATIENT)
Dept: GASTROENTEROLOGY | Facility: CLINIC | Age: 59
End: 2024-01-04
Payer: COMMERCIAL

## 2024-01-04 ENCOUNTER — PATIENT ROUNDING (BHMG ONLY) (OUTPATIENT)
Dept: GASTROENTEROLOGY | Facility: CLINIC | Age: 59
End: 2024-01-04
Payer: COMMERCIAL

## 2024-01-04 VITALS
SYSTOLIC BLOOD PRESSURE: 108 MMHG | WEIGHT: 232.2 LBS | HEIGHT: 65 IN | DIASTOLIC BLOOD PRESSURE: 78 MMHG | BODY MASS INDEX: 38.69 KG/M2

## 2024-01-04 DIAGNOSIS — K22.70 BARRETT'S ESOPHAGUS WITHOUT DYSPLASIA: Primary | ICD-10-CM

## 2024-01-04 DIAGNOSIS — Z12.31 ENCOUNTER FOR SCREENING MAMMOGRAM FOR MALIGNANT NEOPLASM OF BREAST: ICD-10-CM

## 2024-01-04 DIAGNOSIS — Z86.010 PERSONAL HISTORY OF COLONIC POLYPS: ICD-10-CM

## 2024-01-04 PROBLEM — Z86.0100 PERSONAL HISTORY OF COLONIC POLYPS: Status: ACTIVE | Noted: 2024-01-04

## 2024-01-04 PROCEDURE — 99204 OFFICE O/P NEW MOD 45 MIN: CPT | Performed by: INTERNAL MEDICINE

## 2024-01-04 PROCEDURE — 77063 BREAST TOMOSYNTHESIS BI: CPT

## 2024-01-04 PROCEDURE — 77067 SCR MAMMO BI INCL CAD: CPT

## 2024-01-04 NOTE — PROGRESS NOTES
PATIENT INFORMATION  Ashlyn Gil       - 1965    CHIEF COMPLAINT  Chief Complaint   Patient presents with    Heartburn    Colon Cancer Screening       HISTORY OF PRESENT ILLNESS  No real GI complaints and only break through is when she eats late     Does however take her meds QHS- so was encouraged to move that to pre dinner    Did apparantly have a double in 2017 at  and those precoedure notes are not in EPIC    Regular BMs and no complaints - we did review Ozempic in detail She has not tried it yet but does have her script        REVIEWED PERTINENT RESULTS/ LABS  Lab Results   Component Value Date    CASEREPORT  2017     Surgical Pathology Report                         Case: [id number]                                  Authorizing Provider:  Leno Alonso, Collected:           2017 10:25 AM                                 MD                                                                           Pathologist:           Néstor Mackey MD      Received:            2017 10:16 PM          Specimens:   1) - Gastric, Body, gastric bx                                                                      2) - Esophagus, Distal, distal esophagus                                                   FINALDX  2017     1.  GASTRIC BIOPSY:            REACTIVE CHANGE WITH MINIMAL CHRONIC INFLAMMATION, NONSPECIFIC.            VASCULAR CONGESTION.            NO IDENTIFIED HELICOBACTER ORGANISMS WITH A SPECIAL STAIN.             NO INTESTINAL METAPLASIA.    2.  DISTAL ESOPHAGUS, BIOPSY:            GASTRIC MUCOSA WITH MINIMAL CHRONIC INFLAMMATION.            NO INTESTINAL METAPLASIA (OR DYSPLASIA).            UNREMARKABLE SQUAMOUS EPITHELIUM.            NO INCREASE OF EOSINOPHILS.    /    CPT CODES:  1. 71692, 73677  2. 68548, 3126F-1P                                     Lab Results   Component Value Date    HGB 14.0 2023    MCV 81.9 2023     2023     ALT 24 12/05/2023    AST 22 12/05/2023    HGBA1C 5.60 12/05/2023    TRIG 73 12/05/2023    FERRITIN 46.30 12/05/2023    IRON 49 01/28/2014      XR Shoulder 2+ View Left    Result Date: 12/5/2023  Narrative: LEFT SHOULDER, 12/5/2023  HISTORY: 58-year-old female complaining of 2 to 3-week history of left shoulder tenderness and popping.  TECHNIQUE: Two view left shoulder series.  FINDINGS: No acute or chronic fracture deformity or additional posttraumatic osseous abnormality is demonstrated. No visible lytic or sclerotic bone lesion.  The glenohumeral and acromioclavicular joints are within normal limits. Left clavicle and included left upper ribs are unremarkable.      Impression: Negative left shoulder series.  This report was finalized on 12/5/2023 1:03 PM by Dr. Jean Pan MD.       REVIEW OF SYSTEMS  Review of Systems   Constitutional:  Negative for activity change, chills, fever and unexpected weight change.   HENT:  Negative for congestion.    Eyes:  Negative for visual disturbance.   Respiratory:  Negative for shortness of breath.    Cardiovascular:  Negative for chest pain and palpitations.   Gastrointestinal:  Positive for diarrhea. Negative for abdominal pain and blood in stool.   Endocrine: Negative for cold intolerance and heat intolerance.   Genitourinary:  Negative for hematuria.   Musculoskeletal:  Negative for gait problem.   Skin:  Negative for color change.   Allergic/Immunologic: Negative for immunocompromised state.   Neurological:  Negative for weakness and light-headedness.   Hematological:  Negative for adenopathy.   Psychiatric/Behavioral:  Negative for sleep disturbance. The patient is not nervous/anxious.          ACTIVE PROBLEMS  Patient Active Problem List    Diagnosis     York's esophagus without dysplasia [K22.70]     Personal history of colonic polyps [Z86.010]     Morbid (severe) obesity due to excess calories [E66.01]     Anxiolytic-induced insomnia [F13.982]      Gastroesophageal reflux disease [K21.9]     Hyperlipidemia LDL goal <100 [E78.5]     History of colonoscopy with polypectomy [Z98.890, Z86.010]     Pap smear for cervical cancer screening [Z12.4]     Acute pain of left shoulder [M25.512]     Need for hepatitis C screening test [Z11.59]     Total, mature senile cataract [H25.89]          PAST MEDICAL HISTORY  Past Medical History:   Diagnosis Date    Anxiety     Carpal tunnel syndrome     Depression     Diabetes mellitus, type 2     Diarrhea     Edema     Esophageal reflux     Esophagitis, reflux     Fatigue     Heartburn     Hemorrhoids     Hiatal hernia     History of bone density study 2010    Normal    History of colonoscopy 06/22/2012    Normal    History of esophagogastroduodenoscopy 06/22/2012    Olman Esophagus    History of mammogram 07/2012    Normal    Hypercholesterolemia     Hyperlipidemia     Insomnia     Malnutrition     Morbid obesity     Pain in soft tissues of limb     Knee    Plantar fasciitis     Poor vision     Prediabetes     Restless legs syndrome     Right upper quadrant pain     Sciatica     Sleep apnea     Wears glasses     Wrist joint pain     Bilateral         SURGICAL HISTORY  Past Surgical History:   Procedure Laterality Date    BILATERAL BREAST REDUCTION  12/01/2010    CHOLECYSTECTOMY  11/12/2012    EYE SURGERY      GASTRIC RESTRICTION SURGERY  12/09/2013    VGS, VERTICAL GASTRIC SLEEVE    HERNIA REPAIR  12/09/2013    Paraesophageal Hiatus Hernia    LEG SURGERY  2010    RIGHT LEG, ROSI AND SCREWS    PAP SMEAR  07/2012    Normal    TONSILLECTOMY AND ADENOIDECTOMY  1970    TUBAL ABDOMINAL LIGATION  01/11/1995         FAMILY HISTORY  Family History   Problem Relation Age of Onset    Colon cancer Father     Diabetes Maternal Grandmother     Alzheimer's disease Paternal Grandmother     Heart disease Paternal Grandfather          SOCIAL HISTORY  Social History     Occupational History    Not on file   Tobacco Use    Smoking status: Never      "Passive exposure: Never    Smokeless tobacco: Never   Vaping Use    Vaping Use: Never used   Substance and Sexual Activity    Alcohol use: Yes     Comment: Social use    Drug use: Defer    Sexual activity: Defer         CURRENT MEDICATIONS    Current Outpatient Medications:     esomeprazole (nexIUM) 40 MG capsule, Take 1 capsule by mouth Every Morning Before Breakfast., Disp: 90 capsule, Rfl: 1    hydrOXYzine (ATARAX) 10 MG tablet, Take 1 tablet by mouth At Night As Needed for Anxiety., Disp: 30 tablet, Rfl: 0    multivitamin (MULTIVITAMIN PO), Take  by mouth Daily., Disp: , Rfl:     Semaglutide-Weight Management (Wegovy) 0.25 MG/0.5ML solution auto-injector, Inject 0.25 mg under the skin into the appropriate area as directed 1 (One) Time Per Week., Disp: 2 mL, Rfl: 1    ALLERGIES  Patient has no known allergies.    VITALS  Vitals:    01/04/24 0807   BP: 108/78   BP Location: Left arm   Patient Position: Sitting   Cuff Size: Large Adult   Weight: 105 kg (232 lb 3.2 oz)   Height: 165.1 cm (65\")       PHYSICAL EXAM  Debilities/Disabilities Identified: None  Emotional Behavior: Appropriate  Wt Readings from Last 3 Encounters:   01/04/24 105 kg (232 lb 3.2 oz)   01/03/24 106 kg (232 lb 9.6 oz)   12/05/23 105 kg (230 lb 6.4 oz)     Ht Readings from Last 1 Encounters:   01/04/24 165.1 cm (65\")     Body mass index is 38.64 kg/m².  Physical Exam  Constitutional:       Appearance: She is well-developed. She is not diaphoretic.   HENT:      Head: Normocephalic and atraumatic.   Eyes:      General: No scleral icterus.     Conjunctiva/sclera: Conjunctivae normal.      Pupils: Pupils are equal, round, and reactive to light.   Neck:      Thyroid: No thyromegaly.   Cardiovascular:      Rate and Rhythm: Normal rate and regular rhythm.      Heart sounds: Normal heart sounds. No murmur heard.     No gallop.   Pulmonary:      Effort: Pulmonary effort is normal.      Breath sounds: Normal breath sounds. No wheezing or rales. "   Abdominal:      General: Bowel sounds are normal. There is no distension or abdominal bruit.      Palpations: Abdomen is soft. There is no shifting dullness, fluid wave or mass.      Tenderness: There is no abdominal tenderness. There is no guarding. Negative signs include Vásquez's sign.      Hernia: There is no hernia in the ventral area.   Musculoskeletal:         General: Normal range of motion.      Cervical back: Normal range of motion and neck supple.   Lymphadenopathy:      Cervical: No cervical adenopathy.   Skin:     General: Skin is warm and dry.      Findings: No erythema or rash.   Neurological:      Mental Status: She is alert and oriented to person, place, and time.   Psychiatric:         Mood and Affect: Mood normal.         Behavior: Behavior normal.         CLINICAL DATA REVIEWED   reviewed previous lab results and integrated with today's visit, reviewed notes from other physicians and/or last GI encounter, reviewed previous endoscopy results and available photos, reviewed surgical pathology results from previous biopsies    ASSESSMENT  Diagnoses and all orders for this visit:    York's esophagus without dysplasia  -     Case Request; Standing  -     Case Request    Personal history of colonic polyps  -     Case Request; Standing  -     Case Request    Other orders  -     Follow Anesthesia Guidelines / Protocol; Future          PLAN  No follow-ups on file.    I have discussed the above plan with the patient.  They verbalize understanding and are in agreement with the plan.  They have been advised to contact the office for any questions, concerns, or changes related to their health.

## 2024-01-05 ENCOUNTER — PATIENT ROUNDING (BHMG ONLY) (OUTPATIENT)
Dept: GASTROENTEROLOGY | Facility: CLINIC | Age: 59
End: 2024-01-05
Payer: COMMERCIAL

## 2024-01-05 LAB
CYTOLOGIST CVX/VAG CYTO: NORMAL
CYTOLOGY CVX/VAG DOC CYTO: NORMAL
CYTOLOGY CVX/VAG DOC THIN PREP: NORMAL
DX ICD CODE: NORMAL
HIV 1 & 2 AB SER-IMP: NORMAL
HPV I/H RISK 4 DNA CVX QL PROBE+SIG AMP: NEGATIVE
OTHER STN SPEC: NORMAL
STAT OF ADQ CVX/VAG CYTO-IMP: NORMAL

## 2024-01-05 NOTE — PROGRESS NOTES
[facility]  [address] [address] [phone].    Before we get started may I verify your date of birth? 1965    I am calling to officially welcome you to our practice and ask about your recent visit. Is this a good time to talk? yes    Tell me about your visit with us. What things went well?  Everything went smoothly       We're always looking for ways to make our patients' experiences even better. Do you have recommendations on ways we may improve?  no    Overall were you satisfied with your first visit to our practice? yes       I appreciate you taking the time to speak with me today. Is there anything else I can do for you? no      Thank you, and have a great day.

## 2024-01-05 NOTE — PROGRESS NOTES
[facility]  [address]  [facility] [address] [phone].    Before we get started may I verify your date of birth? 1965    I am calling to officially welcome you to our practice and ask about your recent visit. Is this a good time to talk? yes    Tell me about your visit with us. What things went well?  Everything went smoothly.       We're always looking for ways to make our patients' experiences even better. Do you have recommendations on ways we may improve?  no    Overall were you satisfied with your first visit to our practice? yes       I appreciate you taking the time to speak with me today. Is there anything else I can do for you? no      Thank you, and have a great day.

## 2024-01-17 DIAGNOSIS — E78.5 HYPERLIPIDEMIA LDL GOAL <100: ICD-10-CM

## 2024-01-17 DIAGNOSIS — R73.01 IFG (IMPAIRED FASTING GLUCOSE): ICD-10-CM

## 2024-02-27 ENCOUNTER — HOSPITAL ENCOUNTER (OUTPATIENT)
Facility: SURGERY CENTER | Age: 59
Setting detail: HOSPITAL OUTPATIENT SURGERY
Discharge: HOME | End: 2024-02-27
Attending: INTERNAL MEDICINE | Admitting: INTERNAL MEDICINE
Payer: COMMERCIAL

## 2024-02-27 ENCOUNTER — ANESTHESIA (OUTPATIENT)
Dept: SURGERY | Facility: SURGERY CENTER | Age: 59
End: 2024-02-27
Payer: COMMERCIAL

## 2024-02-27 ENCOUNTER — ANESTHESIA EVENT (OUTPATIENT)
Dept: SURGERY | Facility: SURGERY CENTER | Age: 59
End: 2024-02-27
Payer: COMMERCIAL

## 2024-02-27 VITALS
HEIGHT: 65 IN | DIASTOLIC BLOOD PRESSURE: 53 MMHG | TEMPERATURE: 97.2 F | RESPIRATION RATE: 16 BRPM | SYSTOLIC BLOOD PRESSURE: 110 MMHG | BODY MASS INDEX: 37.92 KG/M2 | WEIGHT: 227.6 LBS | HEART RATE: 72 BPM | OXYGEN SATURATION: 98 %

## 2024-02-27 DIAGNOSIS — Z86.010 PERSONAL HISTORY OF COLONIC POLYPS: ICD-10-CM

## 2024-02-27 DIAGNOSIS — K22.70 BARRETT'S ESOPHAGUS WITHOUT DYSPLASIA: ICD-10-CM

## 2024-02-27 PROCEDURE — 25010000002 LIDOCAINE 1 % SOLUTION: Performed by: INTERNAL MEDICINE

## 2024-02-27 PROCEDURE — 45385 COLONOSCOPY W/LESION REMOVAL: CPT | Performed by: INTERNAL MEDICINE

## 2024-02-27 PROCEDURE — 25010000002 PROPOFOL 10 MG/ML EMULSION: Performed by: ANESTHESIOLOGY

## 2024-02-27 PROCEDURE — 25810000003 LACTATED RINGERS PER 1000 ML: Performed by: INTERNAL MEDICINE

## 2024-02-27 PROCEDURE — 43239 EGD BIOPSY SINGLE/MULTIPLE: CPT | Performed by: INTERNAL MEDICINE

## 2024-02-27 PROCEDURE — 88305 TISSUE EXAM BY PATHOLOGIST: CPT | Performed by: INTERNAL MEDICINE

## 2024-02-27 RX ORDER — SODIUM CHLORIDE, SODIUM LACTATE, POTASSIUM CHLORIDE, CALCIUM CHLORIDE 600; 310; 30; 20 MG/100ML; MG/100ML; MG/100ML; MG/100ML
1000 INJECTION, SOLUTION INTRAVENOUS CONTINUOUS
Status: DISCONTINUED | OUTPATIENT
Start: 2024-02-27 | End: 2024-02-27 | Stop reason: HOSPADM

## 2024-02-27 RX ORDER — LIDOCAINE HYDROCHLORIDE 10 MG/ML
0.5 INJECTION, SOLUTION INFILTRATION; PERINEURAL ONCE AS NEEDED
Status: COMPLETED | OUTPATIENT
Start: 2024-02-27 | End: 2024-02-27

## 2024-02-27 RX ORDER — SODIUM CHLORIDE 0.9 % (FLUSH) 0.9 %
10 SYRINGE (ML) INJECTION AS NEEDED
Status: DISCONTINUED | OUTPATIENT
Start: 2024-02-27 | End: 2024-02-27 | Stop reason: HOSPADM

## 2024-02-27 RX ADMIN — LIDOCAINE HYDROCHLORIDE 50 MG: 10 INJECTION, SOLUTION INFILTRATION; PERINEURAL at 14:48

## 2024-02-27 RX ADMIN — SODIUM CHLORIDE, POTASSIUM CHLORIDE, SODIUM LACTATE AND CALCIUM CHLORIDE 1000 ML: 600; 310; 30; 20 INJECTION, SOLUTION INTRAVENOUS at 13:36

## 2024-02-27 RX ADMIN — PROPOFOL 140 MCG/KG/MIN: 10 INJECTION, EMULSION INTRAVENOUS at 14:51

## 2024-02-27 NOTE — BRIEF OP NOTE
ESOPHAGOGASTRODUODENOSCOPY, COLONOSCOPY  Progress Note    Ashlyn Gil  2/27/2024    Pre-op Diagnosis:   York's esophagus without dysplasia [K22.70]  Personal history of colonic polyps [Z86.010]       Post-Op Diagnosis Codes:     * York's esophagus without dysplasia [K22.70]     * Personal history of colonic polyps [Z86.010]     * S/P gastric sleeve procedure [Z90.3]     * Gastritis [K29.70]     * Reflux esophagitis [K21.00]     * Diverticulosis [K57.90]     * Colon polyp [K63.5]    Procedure/CPT® Codes:        Procedure(s):  ESOPHAGOGASTRODUODENOSCOPY  COLONOSCOPY              Surgeon(s):  Leno Alonso MD    Anesthesia: Monitored Anesthesia Care    Staff:   Endo Technician: Antonette Greene RN  Endo Nurse: Alma Rodriguez RN         Estimated Blood Loss: none    Urine Voided: * No values recorded between 2/27/2024  2:46 PM and 2/27/2024  3:20 PM *    Specimens:                Specimens       ID Source Type Tests Collected By Collected At Frozen?    A Gastric, Antrum Tissue TISSUE PATHOLOGY EXAM   Leno Alonso MD 2/27/24 1459 No    Description: Gastric Biopsy    B Esophagus, Distal Tissue TISSUE PATHOLOGY EXAM   Leno Alonso MD 2/27/24 1459 No    Description: Distal Esophagus Biopsy    C Large Intestine, Right / Ascending Colon Tissue TISSUE PATHOLOGY EXAM   Leno Alonso MD 2/27/24 1510 No    Description: Ascending Polyp                  Drains: * No LDAs found *    Findings: S/P Gastric SLeeve  Gastritis-Biopsy  Normal Duodenum  Reflux Esophagtiis-Biopsy    Colon to TI good Prep  Sigmoid Divertciulosis  Polyp-Cold  Snare        Complications: None          Leno Alonso MD     Date: 2/27/2024  Time: 15:22 EST

## 2024-02-27 NOTE — ANESTHESIA POSTPROCEDURE EVALUATION
"Patient: Ashlyn Gil    Procedure Summary       Date: 02/27/24 Room / Location: [facility] / [facility]    Anesthesia Start: 1446 Anesthesia Stop: 1519    Procedures:       ESOPHAGOGASTRODUODENOSCOPY (Esophagus)      COLONOSCOPY Diagnosis:       York's esophagus without dysplasia      Personal history of colonic polyps      S/P gastric sleeve procedure      Gastritis      Reflux esophagitis      Diverticulosis      Colon polyp      (York's esophagus without dysplasia [K22.70])      (Personal history of colonic polyps [Z86.010])    Surgeons: Leno Alonso MD Provider: Alma Saunders MD    Anesthesia Type: MAC ASA Status: 3            Anesthesia Type: MAC    Vitals  Vitals Value Taken Time   BP 86/60 02/27/24 1524   Temp 36.2 °C (97.2 °F) 02/27/24 1522   Pulse 72 02/27/24 1524   Resp 16 02/27/24 1522   SpO2 96 % 02/27/24 1524   Vitals shown include unfiled device data.        Post Anesthesia Care and Evaluation    Patient location during evaluation: bedside  Patient participation: complete - patient participated  Level of consciousness: awake  Pain management: adequate    Airway patency: patent  Anesthetic complications: No anesthetic complications  PONV Status: controlled  Cardiovascular status: acceptable  Respiratory status: acceptable  Hydration status: acceptable    Comments: BP (!) 86/60   Pulse 85   Temp 36.2 °C (97.2 °F) (Temporal)   Resp 16   Ht 165.1 cm (65\")   Wt 103 kg (227 lb 9.6 oz)   SpO2 96%   BMI 37.87 kg/m²       "

## 2024-02-27 NOTE — H&P
Patient Care Team:  Teresa Larkin APRN as PCP - General    CHIEF COMPLAINT: Family hx of CRC or polyps and dyspepsia    HISTORY OF PRESENT ILLNESS:  S/P Sleeve, last Colon was 2017    Past Medical History:   Diagnosis Date    Anxiety     Carpal tunnel syndrome     Depression     Diabetes mellitus, type 2     Diarrhea     Edema     Esophageal reflux     Esophagitis, reflux     Fatigue     Heartburn     Hemorrhoids     Hiatal hernia     History of bone density study 2010    Normal    History of colonoscopy 06/22/2012    Normal    History of esophagogastroduodenoscopy 06/22/2012    Olman Esophagus    History of mammogram 07/2012    Normal    Hypercholesterolemia     Hyperlipidemia     Insomnia     Malnutrition     Morbid obesity     Pain in soft tissues of limb     Knee    Plantar fasciitis     Poor vision     Prediabetes     Restless legs syndrome     Right upper quadrant pain     Sciatica     Sleep apnea     Wears glasses     Wrist joint pain     Bilateral     Past Surgical History:   Procedure Laterality Date    BILATERAL BREAST REDUCTION  12/01/2010    CHOLECYSTECTOMY  11/12/2012    COLONOSCOPY      ENDOSCOPY      EYE SURGERY      GASTRIC RESTRICTION SURGERY  12/09/2013    VGS, VERTICAL GASTRIC SLEEVE    HERNIA REPAIR  12/09/2013    Paraesophageal Hiatus Hernia    LEG SURGERY  2010    RIGHT LEG, ROSI AND SCREWS    PAP SMEAR  07/2012    Normal    TONSILLECTOMY AND ADENOIDECTOMY  1970    TUBAL ABDOMINAL LIGATION  01/11/1995     Family History   Problem Relation Age of Onset    Colon cancer Father     Diabetes Maternal Grandmother     Alzheimer's disease Paternal Grandmother     Heart disease Paternal Grandfather     Breast cancer Neg Hx      Social History     Tobacco Use    Smoking status: Never     Passive exposure: Never    Smokeless tobacco: Never   Vaping Use    Vaping Use: Never used   Substance Use Topics    Alcohol use: Yes     Comment: Social use    Drug use: Defer     Medications Prior to Admission  "  Medication Sig Dispense Refill Last Dose    esomeprazole (nexIUM) 40 MG capsule Take 1 capsule by mouth Every Morning Before Breakfast. 90 capsule 1 2/26/2024    hydrOXYzine (ATARAX) 10 MG tablet Take 1 tablet by mouth At Night As Needed for Anxiety. 30 tablet 0 Past Week    multivitamin (MULTIVITAMIN PO) Take  by mouth Daily.   Past Week    Semaglutide-Weight Management (Wegovy) 0.25 MG/0.5ML solution auto-injector Inject 0.25 mg under the skin into the appropriate area as directed 1 (One) Time Per Week. 2 mL 1      Allergies:  Patient has no known allergies.    REVIEW OF SYSTEMS:  Please see the above history of present illness for pertinent positives and negatives.  The remainder of the patient's systems have been reviewed and are negative.     Vital Signs  Temp:  [97.7 °F (36.5 °C)] 97.7 °F (36.5 °C)  Heart Rate:  [88] 88  Resp:  [16] 16  BP: (132)/(76) 132/76    Flowsheet Rows      Flowsheet Row First Filed Value   Admission Height 165.1 cm (65\") Documented at 02/26/2024 1206   Admission Weight 105 kg (232 lb) Documented at 02/26/2024 1206             Physical Exam:  Physical Exam   Constitutional: Patient appears well-developed and well-nourished and in no acute distress   HEENT:   Head: Normocephalic and atraumatic.   Eyes:  Pupils are equal, round, and reactive to light. EOM are intact. Sclerae are anicteric and non-injected.  Mouth and Throat: Patient has moist mucous membranes. Oropharynx is clear of any erythema or exudate.     Neck: Neck supple. No JVD present. No thyromegaly present. No lymphadenopathy present.  Cardiovascular: Regular rate, regular rhythm, S1 normal and S2 normal.  Exam reveals no gallop and no friction rub.  No murmur heard.  Pulmonary/Chest: Lungs are clear to auscultation bilaterally. No respiratory distress. No wheezes. No rhonchi. No rales.   Abdominal: Soft. Bowel sounds are normal. No distension and no mass. There is no hepatosplenomegaly. There is no tenderness. "   Musculoskeletal: Normal Muscle tone  Extremities: No edema. Pulses are palpable in all 4 extremities.  Neurological: Patient is alert and oriented to person, place, and time. Cranial nerves II-XII are grossly intact with no focal deficits.  Skin: Skin is warm. No rash noted. Nails show no clubbing.  No cyanosis or erythema.    Debilities/Disabilities Identified: None  Emotional Behavior: Appropriate     Results Review:   I reviewed the patient's new clinical results.    Lab Results (most recent)       None            Imaging Results (Most Recent)       None          reviewed    ECG/EMG Results (most recent)       None          reviewed    Assessment & Plan   Family hx of CRC or polyps and dyspepsia/  EGD and colonoscopy      I discussed the patient's findings and my recommendations with patient.     Leno Alonso MD  02/27/24  14:14 EST    Time: 10 min prior to procedure.

## 2024-02-27 NOTE — ANESTHESIA PREPROCEDURE EVALUATION
" Anesthesia Evaluation     Patient summary reviewed and Nursing notes reviewed   NPO Solid Status: > 8 hours  NPO Liquid Status: > 2 hours           Airway   Mallampati: II  Dental - normal exam     Pulmonary    (+) ,sleep apnea  Cardiovascular   Exercise tolerance: good (4-7 METS)    Rhythm: regular    (+) hyperlipidemia      Neuro/Psych  (+) numbness, psychiatric history  GI/Hepatic/Renal/Endo    (+) obesity, morbid obesity, hiatal hernia, GERD, diabetes mellitus type 2    Musculoskeletal (-) negative ROS    Abdominal   (+) obese   Substance History - negative use     OB/GYN negative ob/gyn ROS         Other                    Anesthesia Plan    ASA 3     MAC     (Ht 165.1 cm (65\")   Wt 105 kg (232 lb)   BMI 38.61 kg/m²     I have reviewed the patient's history with the patient and the chart, including all pertinent laboratory results and imaging. I have explained the risks of anesthesia including but not limited to dental damage, corneal abrasion, nerve injury, MI, stroke, and death.    )  intravenous induction     Anesthetic plan, risks, benefits, and alternatives have been provided, discussed and informed consent has been obtained with: patient.    CODE STATUS:         "

## 2024-02-28 LAB
LAB AP CASE REPORT: NORMAL
PATH REPORT.FINAL DX SPEC: NORMAL
PATH REPORT.GROSS SPEC: NORMAL

## 2024-03-01 DIAGNOSIS — F13.982 ANXIOLYTIC-INDUCED INSOMNIA: ICD-10-CM

## 2024-03-04 RX ORDER — HYDROXYZINE HYDROCHLORIDE 10 MG/1
10 TABLET, FILM COATED ORAL NIGHTLY PRN
Qty: 30 TABLET | Refills: 0 | Status: SHIPPED | OUTPATIENT
Start: 2024-03-04

## 2024-03-04 NOTE — TELEPHONE ENCOUNTER
No checkmarks available    Rx Refill Note  Requested Prescriptions     Pending Prescriptions Disp Refills    hydrOXYzine (ATARAX) 10 MG tablet 30 tablet 0     Sig: Take 1 tablet by mouth At Night As Needed for Anxiety.      Last office visit with prescribing clinician: 1/3/2024   Last telemedicine visit with prescribing clinician: Visit date not found   Next office visit with prescribing clinician: 3/29/2024                         Would you like a call back once the refill request has been completed: [] Yes [] No    If the office needs to give you a call back, can they leave a voicemail: [] Yes [] No    Curtis Watson, PCT  03/04/24, 12:31 EST

## 2024-03-22 LAB
ALBUMIN SERPL-MCNC: 4.4 G/DL (ref 3.5–5.2)
ALBUMIN/GLOB SERPL: 1.8 G/DL
ALP SERPL-CCNC: 66 U/L (ref 39–117)
ALT SERPL-CCNC: 16 U/L (ref 1–33)
AST SERPL-CCNC: 15 U/L (ref 1–32)
BILIRUB SERPL-MCNC: 0.3 MG/DL (ref 0–1.2)
BUN SERPL-MCNC: 18 MG/DL (ref 8–23)
BUN/CREAT SERPL: 22.2 (ref 7–25)
CALCIUM SERPL-MCNC: 9.4 MG/DL (ref 8.6–10.5)
CHLORIDE SERPL-SCNC: 103 MMOL/L (ref 98–107)
CHOLEST SERPL-MCNC: 220 MG/DL (ref 0–200)
CHOLEST/HDLC SERPL: 2.72 {RATIO}
CO2 SERPL-SCNC: 28.2 MMOL/L (ref 22–29)
CREAT SERPL-MCNC: 0.81 MG/DL (ref 0.57–1)
EGFRCR SERPLBLD CKD-EPI 2021: 83.2 ML/MIN/1.73
GLOBULIN SER CALC-MCNC: 2.4 GM/DL
GLUCOSE SERPL-MCNC: 88 MG/DL (ref 65–99)
HBA1C MFR BLD: 5.7 % (ref 4.8–5.6)
HDLC SERPL-MCNC: 81 MG/DL (ref 40–60)
LDLC SERPL CALC-MCNC: 125 MG/DL (ref 0–100)
POTASSIUM SERPL-SCNC: 4.1 MMOL/L (ref 3.5–5.2)
PROT SERPL-MCNC: 6.8 G/DL (ref 6–8.5)
SODIUM SERPL-SCNC: 141 MMOL/L (ref 136–145)
TRIGL SERPL-MCNC: 79 MG/DL (ref 0–150)
VLDLC SERPL CALC-MCNC: 14 MG/DL (ref 5–40)

## 2024-03-29 ENCOUNTER — OFFICE VISIT (OUTPATIENT)
Dept: INTERNAL MEDICINE | Facility: CLINIC | Age: 59
End: 2024-03-29
Payer: COMMERCIAL

## 2024-03-29 VITALS
HEART RATE: 71 BPM | HEIGHT: 65 IN | WEIGHT: 224 LBS | SYSTOLIC BLOOD PRESSURE: 120 MMHG | DIASTOLIC BLOOD PRESSURE: 84 MMHG | TEMPERATURE: 97.8 F | OXYGEN SATURATION: 98 % | BODY MASS INDEX: 37.32 KG/M2

## 2024-03-29 DIAGNOSIS — E66.01 MORBID (SEVERE) OBESITY DUE TO EXCESS CALORIES: Primary | ICD-10-CM

## 2024-03-29 RX ORDER — SEMAGLUTIDE 0.5 MG/.5ML
0.5 INJECTION, SOLUTION SUBCUTANEOUS WEEKLY
Qty: 6 ML | Refills: 0 | Status: SHIPPED | OUTPATIENT
Start: 2024-03-29

## 2024-03-29 NOTE — PROGRESS NOTES
Chief Complaint   Patient presents with    Weight Loss     F/u   Answers submitted by the patient for this visit:  Other (Submitted on 3/22/2024)  Please describe your symptoms.: Wegovey medication follow up.  Have you had these symptoms before?: Yes  How long have you been having these symptoms?: Greater than 2 weeks  Please list any medications you are currently taking for this condition.: Chart is correct, now new meds  Primary Reason for Visit (Submitted on 3/22/2024)  What is the primary reason for your visit?: Other      Subjective     Ashlyn Gil is a 58 y.o. female being seen for a follow up appointment today regarding obesity. She has had 4 injections of wegovy. She denies nausea or constipation from the medication. No trouble with injection site. She has lost 6  pounds    Weight Loss  Pertinent negatives include no abdominal pain or chest pain.        No Known Allergies      Current Outpatient Medications:     esomeprazole (nexIUM) 40 MG capsule, Take 1 capsule by mouth Every Morning Before Breakfast., Disp: 90 capsule, Rfl: 1    hydrOXYzine (ATARAX) 10 MG tablet, Take 1 tablet by mouth At Night As Needed for Anxiety., Disp: 30 tablet, Rfl: 0    multivitamin (MULTIVITAMIN PO), Take  by mouth Daily., Disp: , Rfl:     Semaglutide-Weight Management (Wegovy) 0.25 MG/0.5ML solution auto-injector, Inject 0.25 mg under the skin into the appropriate area as directed 1 (One) Time Per Week., Disp: 2 mL, Rfl: 1    The following portions of the patient's history were reviewed and updated as appropriate: allergies, current medications, past family history, past medical history, past social history, past surgical history, and problem list.    Review of Systems   Constitutional:  Positive for weight loss.   HENT: Negative.     Eyes: Negative.    Respiratory: Negative.     Cardiovascular: Negative.  Negative for chest pain and leg swelling.   Gastrointestinal: Negative.  Negative for abdominal distention and abdominal  pain.   Endocrine: Negative.    Musculoskeletal: Negative.    Allergic/Immunologic: Negative.    Neurological: Negative.    Hematological: Negative.    Psychiatric/Behavioral: Negative.     All other systems reviewed and are negative.      Assessment     Physical Exam  Vitals reviewed.   Constitutional:       Appearance: Normal appearance. She is obese.   Cardiovascular:      Rate and Rhythm: Normal rate and regular rhythm.      Pulses: Normal pulses.      Heart sounds: Normal heart sounds.   Pulmonary:      Effort: Pulmonary effort is normal. No respiratory distress.      Breath sounds: Normal breath sounds. No stridor.   Neurological:      General: No focal deficit present.      Mental Status: She is alert and oriented to person, place, and time.   Psychiatric:         Mood and Affect: Mood normal.         Behavior: Behavior normal.       Plan         Diagnoses and all orders for this visit:    1. Morbid (severe) obesity due to excess calories (Primary)  Comments:  BMI 37.3. Wegovy increase to 0.5mg weekly  Orders:  -     Semaglutide-Weight Management (Wegovy) 0.5 MG/0.5ML solution auto-injector; Inject 0.5 mL under the skin into the appropriate area as directed 1 (One) Time Per Week.  Dispense: 6 mL; Refill: 0      Follow up in 3 months

## 2024-03-30 DIAGNOSIS — K21.00 GASTROESOPHAGEAL REFLUX DISEASE WITH ESOPHAGITIS WITHOUT HEMORRHAGE: ICD-10-CM

## 2024-04-01 RX ORDER — ESOMEPRAZOLE MAGNESIUM 40 MG/1
40 CAPSULE, DELAYED RELEASE ORAL
Qty: 90 CAPSULE | Refills: 1 | Status: SHIPPED | OUTPATIENT
Start: 2024-04-01

## 2024-04-01 NOTE — TELEPHONE ENCOUNTER
Rx Refill Note  Requested Prescriptions     Pending Prescriptions Disp Refills    esomeprazole (nexIUM) 40 MG capsule 90 capsule 1     Sig: Take 1 capsule by mouth Every Morning Before Breakfast.      Last office visit with prescribing clinician: 3/29/2024   Last telemedicine visit with prescribing clinician: Visit date not found   Next office visit with prescribing clinician: Visit date not found                         Would you like a call back once the refill request has been completed: [] Yes [] No    If the office needs to give you a call back, can they leave a voicemail: [] Yes [] No    Curtis Araujo MA  04/01/24, 10:23 EDT

## 2024-04-10 DIAGNOSIS — F13.982 ANXIOLYTIC-INDUCED INSOMNIA: ICD-10-CM

## 2024-04-10 RX ORDER — HYDROXYZINE HYDROCHLORIDE 10 MG/1
10 TABLET, FILM COATED ORAL NIGHTLY PRN
Qty: 30 TABLET | Refills: 0 | Status: SHIPPED | OUTPATIENT
Start: 2024-04-10

## 2024-05-19 DIAGNOSIS — F13.982 ANXIOLYTIC-INDUCED INSOMNIA: ICD-10-CM

## 2024-05-19 RX ORDER — HYDROXYZINE HYDROCHLORIDE 10 MG/1
10 TABLET, FILM COATED ORAL NIGHTLY PRN
Qty: 30 TABLET | Refills: 0 | Status: SHIPPED | OUTPATIENT
Start: 2024-05-19

## 2024-05-19 NOTE — TELEPHONE ENCOUNTER
Rx Refill Note  Requested Prescriptions     Pending Prescriptions Disp Refills    hydrOXYzine (ATARAX) 10 MG tablet 30 tablet 0     Sig: Take 1 tablet by mouth At Night As Needed for Anxiety.      Last office visit with prescribing clinician: 3/29/2024   Last telemedicine visit with prescribing clinician: Visit date not found   Next office visit with prescribing clinician: Visit date not found                         Would you like a call back once the refill request has been completed: [] Yes [] No    If the office needs to give you a call back, can they leave a voicemail: [] Yes [] No    Teri Walton MA  05/19/24, 12:22 EDT

## 2024-05-29 ENCOUNTER — TELEPHONE (OUTPATIENT)
Dept: INTERNAL MEDICINE | Facility: CLINIC | Age: 59
End: 2024-05-29
Payer: COMMERCIAL

## 2024-05-29 DIAGNOSIS — K21.00 GASTROESOPHAGEAL REFLUX DISEASE WITH ESOPHAGITIS WITHOUT HEMORRHAGE: ICD-10-CM

## 2024-05-29 DIAGNOSIS — E78.5 HYPERLIPIDEMIA LDL GOAL <100: Primary | ICD-10-CM

## 2024-05-29 RX ORDER — ESOMEPRAZOLE MAGNESIUM 40 MG/1
40 CAPSULE, DELAYED RELEASE ORAL
Qty: 30 CAPSULE | Refills: 0 | Status: SHIPPED | OUTPATIENT
Start: 2024-05-29

## 2024-05-29 NOTE — TELEPHONE ENCOUNTER
She scheduled a lab appt for tomorrow to have labs done prior to her appt next week. She will need orders please

## 2024-05-29 NOTE — TELEPHONE ENCOUNTER
Called and spoke with patient and informed her of this and got the appointment for tomorrow cancelled.

## 2024-06-04 ENCOUNTER — OFFICE VISIT (OUTPATIENT)
Dept: INTERNAL MEDICINE | Facility: CLINIC | Age: 59
End: 2024-06-04
Payer: COMMERCIAL

## 2024-06-04 VITALS
SYSTOLIC BLOOD PRESSURE: 124 MMHG | WEIGHT: 217.8 LBS | BODY MASS INDEX: 36.29 KG/M2 | OXYGEN SATURATION: 98 % | HEIGHT: 65 IN | HEART RATE: 83 BPM | DIASTOLIC BLOOD PRESSURE: 86 MMHG | TEMPERATURE: 98 F

## 2024-06-04 DIAGNOSIS — E66.01 MORBID (SEVERE) OBESITY DUE TO EXCESS CALORIES: Primary | ICD-10-CM

## 2024-06-04 PROCEDURE — 99213 OFFICE O/P EST LOW 20 MIN: CPT | Performed by: NURSE PRACTITIONER

## 2024-06-04 NOTE — PROGRESS NOTES
Chief Complaint   Patient presents with    Weight Loss     F/u       Subjective     Ashlyn Gil is a 58 y.o. female being seen for a follow up appointment today regarding obesity. She started on wegovy in January of 2024 and tapered up to 0.5mg weekly. Starting weight was 232 pounds. She has lost 15 pounds. She denies nay difficulty with nausea, constipation, or injections site issues.       History of Present Illness .    No Known Allergies      Current Outpatient Medications:     esomeprazole (nexIUM) 40 MG capsule, TAKE ONE CAPSULE BY MOUTH EVERY MORNING BEFORE BREAKFAST, Disp: 30 capsule, Rfl: 0    hydrOXYzine (ATARAX) 10 MG tablet, Take 1 tablet by mouth At Night As Needed for Anxiety., Disp: 30 tablet, Rfl: 0    multivitamin (MULTIVITAMIN PO), Take  by mouth Daily., Disp: , Rfl:     Semaglutide-Weight Management (Wegovy) 0.5 MG/0.5ML solution auto-injector, Inject 0.5 mL under the skin into the appropriate area as directed 1 (One) Time Per Week., Disp: 6 mL, Rfl: 0    The following portions of the patient's history were reviewed and updated as appropriate: allergies, current medications, past family history, past medical history, past social history, past surgical history, and problem list.    Review of Systems   HENT: Negative.     Cardiovascular:  Negative for chest pain, palpitations and leg swelling.   Gastrointestinal: Negative.    Musculoskeletal: Negative.    Hematological: Negative.    Psychiatric/Behavioral: Negative.  Negative for agitation, behavioral problems and decreased concentration.        Assessment     Physical Exam  Vitals reviewed.   Constitutional:       Appearance: Normal appearance. She is obese. She is not ill-appearing.   Cardiovascular:      Rate and Rhythm: Normal rate and regular rhythm.      Pulses: Normal pulses.      Heart sounds: Normal heart sounds. No murmur heard.  Pulmonary:      Effort: Pulmonary effort is normal. No respiratory distress.      Breath sounds: Normal  breath sounds. No stridor.   Skin:     General: Skin is warm and dry.   Neurological:      General: No focal deficit present.      Mental Status: She is alert and oriented to person, place, and time.   Psychiatric:         Mood and Affect: Mood normal.         Behavior: Behavior normal.         Thought Content: Thought content normal.         Plan         Diagnoses and all orders for this visit:    1. Morbid (severe) obesity due to excess calories (Primary)  -     Semaglutide-Weight Management 1 MG/0.5ML solution auto-injector; Inject 0.5 mL under the skin into the appropriate area as directed 1 (One) Time Per Week.  Dispense: 6 mL; Refill: 1          Answers submitted by the patient for this visit:  Other (Submitted on 6/2/2024)  Please describe your symptoms.: Wegovy follow up  Have you had these symptoms before?: No  How long have you been having these symptoms?: Greater than 2 weeks  Primary Reason for Visit (Submitted on 6/2/2024)  What is the primary reason for your visit?: Other    Increase wegovy to 1mg weekly    Follow up at CPE in 6 months

## 2024-06-21 DIAGNOSIS — F13.982 ANXIOLYTIC-INDUCED INSOMNIA: ICD-10-CM

## 2024-06-24 RX ORDER — HYDROXYZINE HYDROCHLORIDE 10 MG/1
10 TABLET, FILM COATED ORAL NIGHTLY PRN
Qty: 30 TABLET | Refills: 0 | Status: SHIPPED | OUTPATIENT
Start: 2024-06-24

## 2024-06-27 DIAGNOSIS — K21.00 GASTROESOPHAGEAL REFLUX DISEASE WITH ESOPHAGITIS WITHOUT HEMORRHAGE: ICD-10-CM

## 2024-06-27 RX ORDER — ESOMEPRAZOLE MAGNESIUM 40 MG/1
40 CAPSULE, DELAYED RELEASE ORAL
Qty: 30 CAPSULE | Refills: 0 | Status: SHIPPED | OUTPATIENT
Start: 2024-06-27

## 2024-06-27 NOTE — TELEPHONE ENCOUNTER
Rx Refill Note  Requested Prescriptions     Pending Prescriptions Disp Refills    esomeprazole (nexIUM) 40 MG capsule [Pharmacy Med Name: ESOMEPRAZOLE MAG DR 40 MG CAP] 30 capsule 0     Sig: TAKE ONE CAPSULE BY MOUTH EVERY MORNING BEFORE BREAKFAST      Last office visit with prescribing clinician: 6/4/2024   Last telemedicine visit with prescribing clinician: Visit date not found   Next office visit with prescribing clinician: Visit date not found                         Would you like a call back once the refill request has been completed: [] Yes [] No    If the office needs to give you a call back, can they leave a voicemail: [] Yes [] No    Curtis Araujo MA  06/27/24, 08:43 EDT

## 2024-07-22 DIAGNOSIS — F13.982 ANXIOLYTIC-INDUCED INSOMNIA: ICD-10-CM

## 2024-07-26 DIAGNOSIS — K21.00 GASTROESOPHAGEAL REFLUX DISEASE WITH ESOPHAGITIS WITHOUT HEMORRHAGE: ICD-10-CM

## 2024-07-26 RX ORDER — ESOMEPRAZOLE MAGNESIUM 40 MG/1
40 CAPSULE, DELAYED RELEASE ORAL
Qty: 90 CAPSULE | Refills: 0 | Status: SHIPPED | OUTPATIENT
Start: 2024-07-26

## 2024-07-31 RX ORDER — HYDROXYZINE HYDROCHLORIDE 10 MG/1
10 TABLET, FILM COATED ORAL NIGHTLY PRN
Qty: 30 TABLET | Refills: 0 | Status: SHIPPED | OUTPATIENT
Start: 2024-07-31

## 2024-07-31 NOTE — TELEPHONE ENCOUNTER
Rx Refill Note  Requested Prescriptions     Pending Prescriptions Disp Refills    hydrOXYzine (ATARAX) 10 MG tablet 30 tablet 0     Sig: Take 1 tablet by mouth At Night As Needed for Anxiety.      Last office visit with prescribing clinician: Visit date not found   Last telemedicine visit with prescribing clinician: Visit date not found   Next office visit with prescribing clinician: Visit date not found                         Would you like a call back once the refill request has been completed: [] Yes [] No    If the office needs to give you a call back, can they leave a voicemail: [] Yes [] No    Xochitl Hussein MA  07/31/24, 13:12 EDT

## 2024-08-27 DIAGNOSIS — F13.982 ANXIOLYTIC-INDUCED INSOMNIA: ICD-10-CM

## 2024-08-29 RX ORDER — HYDROXYZINE HYDROCHLORIDE 10 MG/1
10 TABLET, FILM COATED ORAL NIGHTLY PRN
Qty: 30 TABLET | Refills: 0 | Status: SHIPPED | OUTPATIENT
Start: 2024-08-29

## 2024-08-29 NOTE — TELEPHONE ENCOUNTER
No checkmarks    Rx Refill Note  Requested Prescriptions     Pending Prescriptions Disp Refills    hydrOXYzine (ATARAX) 10 MG tablet 30 tablet 0     Sig: Take 1 tablet by mouth At Night As Needed for Anxiety.      Last office visit with prescribing clinician: 6/4/2024   Last telemedicine visit with prescribing clinician: Visit date not found   Next office visit with prescribing clinician: Visit date not found                         Would you like a call back once the refill request has been completed: [] Yes [] No    If the office needs to give you a call back, can they leave a voicemail: [] Yes [] No    Curtis Araujo MA  08/29/24, 08:52 EDT

## 2024-09-20 DIAGNOSIS — F13.982 ANXIOLYTIC-INDUCED INSOMNIA: ICD-10-CM

## 2024-09-23 RX ORDER — HYDROXYZINE HYDROCHLORIDE 10 MG/1
10 TABLET, FILM COATED ORAL NIGHTLY PRN
Qty: 30 TABLET | Refills: 0 | Status: SHIPPED | OUTPATIENT
Start: 2024-09-23

## 2024-10-22 DIAGNOSIS — K21.00 GASTROESOPHAGEAL REFLUX DISEASE WITH ESOPHAGITIS WITHOUT HEMORRHAGE: ICD-10-CM

## 2024-10-22 RX ORDER — ESOMEPRAZOLE MAGNESIUM 40 MG/1
40 CAPSULE, DELAYED RELEASE ORAL
Qty: 30 CAPSULE | Refills: 5 | Status: SHIPPED | OUTPATIENT
Start: 2024-10-22

## 2024-10-31 DIAGNOSIS — F13.982 ANXIOLYTIC-INDUCED INSOMNIA: ICD-10-CM

## 2024-11-05 RX ORDER — HYDROXYZINE HYDROCHLORIDE 10 MG/1
10 TABLET, FILM COATED ORAL NIGHTLY PRN
Qty: 30 TABLET | Refills: 0 | Status: SHIPPED | OUTPATIENT
Start: 2024-11-05

## 2024-11-05 NOTE — TELEPHONE ENCOUNTER
No checkmarks available   Rx Refill Note  Requested Prescriptions     Pending Prescriptions Disp Refills    hydrOXYzine (ATARAX) 10 MG tablet 30 tablet 0     Sig: Take 1 tablet by mouth At Night As Needed for Anxiety.      Last office visit with prescribing clinician: 6/4/2024   Last telemedicine visit with prescribing clinician: Visit date not found   Next office visit with prescribing clinician: Visit date not found                         Would you like a call back once the refill request has been completed: [] Yes [] No    If the office needs to give you a call back, can they leave a voicemail: [] Yes [] No    Yaneli Pickett MA  11/05/24, 08:55 EST

## 2024-12-04 ENCOUNTER — OFFICE VISIT (OUTPATIENT)
Dept: INTERNAL MEDICINE | Facility: CLINIC | Age: 59
End: 2024-12-04
Payer: COMMERCIAL

## 2024-12-04 VITALS
WEIGHT: 210.8 LBS | SYSTOLIC BLOOD PRESSURE: 118 MMHG | HEIGHT: 65 IN | DIASTOLIC BLOOD PRESSURE: 82 MMHG | OXYGEN SATURATION: 96 % | HEART RATE: 85 BPM | TEMPERATURE: 98.2 F | BODY MASS INDEX: 35.12 KG/M2

## 2024-12-04 DIAGNOSIS — E78.5 HYPERLIPIDEMIA LDL GOAL <100: Primary | ICD-10-CM

## 2024-12-04 DIAGNOSIS — G25.81 RLS (RESTLESS LEGS SYNDROME): ICD-10-CM

## 2024-12-04 DIAGNOSIS — F13.982 ANXIOLYTIC-INDUCED INSOMNIA: ICD-10-CM

## 2024-12-04 DIAGNOSIS — R73.03 PREDIABETES: ICD-10-CM

## 2024-12-04 DIAGNOSIS — E66.01 MORBID (SEVERE) OBESITY DUE TO EXCESS CALORIES: ICD-10-CM

## 2024-12-04 RX ORDER — HYDROXYZINE HYDROCHLORIDE 25 MG/1
25 TABLET, FILM COATED ORAL NIGHTLY PRN
Qty: 90 TABLET | Refills: 1 | Status: SHIPPED | OUTPATIENT
Start: 2024-12-04

## 2024-12-04 RX ORDER — PRAMIPEXOLE DIHYDROCHLORIDE 0.5 MG/1
0.5 TABLET ORAL 3 TIMES DAILY
Start: 2024-12-04

## 2024-12-04 NOTE — PROGRESS NOTES
Chief Complaint   Patient presents with    Morbid (severe) obesity due to excess calories     Follow up        Subjective     Ashlyn Gil is a 59 y.o. female being seen for a follow up appointment today regarding hyperlipidemia, insomnia, GERD, and obesity.    She has had weight issues since a teenager, and has been > 50 pounds overweight since early 20s. She had a gastric sleeve 12-9-2013. She tried an failed keto, WW, nutri-System, and calori count. She started on wegovy in January of 2024 and tapered up to 0.75mg weekly. Starting weight was 232 pounds. She has 22 lost pounds. She denies any difficulty with nausea, constipation, or injections site pain.     She has had increasing difficulty with sleeping. She is on Atarax 10mg nightly. She additionally has had some restless leg affecting her left calf only. Took Mirapex in the past.       History of Present Illness     No Known Allergies      Current Outpatient Medications:     SEMAGLUTIDE-WEIGHT MANAGEMENT SC, 0.75mg injection/once per week beginning 11/29/24. Will increase injection dose to 1.25mg on 12/20, Disp: , Rfl:     esomeprazole (nexIUM) 40 MG capsule, TAKE ONE CAPSULE BY MOUTH EVERY MORNING BEFORE BREAKFAST, Disp: 30 capsule, Rfl: 5    hydrOXYzine (ATARAX) 10 MG tablet, Take 1 tablet by mouth At Night As Needed for Anxiety., Disp: 30 tablet, Rfl: 0    multivitamin (MULTIVITAMIN PO), Take  by mouth Daily., Disp: , Rfl:     The following portions of the patient's history were reviewed and updated as appropriate: allergies, current medications, past family history, past medical history, past social history, past surgical history, and problem list.    Review of Systems   Constitutional: Negative.  Negative for activity change, appetite change, chills, diaphoresis, fatigue and fever.   HENT:  Negative for congestion and sore throat.    Eyes: Negative.    Respiratory: Negative.  Negative for cough and stridor.    Cardiovascular:  Negative for chest pain.    Gastrointestinal:  Negative for abdominal pain, nausea and vomiting.   Endocrine: Negative.    Genitourinary: Negative.  Negative for dysuria.   Musculoskeletal:  Negative for myalgias and neck pain.   Skin:  Negative for rash.   Allergic/Immunologic: Negative.    Neurological:  Negative for weakness, numbness and headaches.   All other systems reviewed and are negative.      Assessment     Physical Exam  Vitals reviewed.   Constitutional:       Appearance: Normal appearance. She is obese.   Cardiovascular:      Rate and Rhythm: Normal rate and regular rhythm.      Pulses: Normal pulses.      Heart sounds: Normal heart sounds. No murmur heard.  Pulmonary:      Effort: Pulmonary effort is normal. No respiratory distress.      Breath sounds: Normal breath sounds. No stridor.   Musculoskeletal:      Right lower leg: No edema.      Left lower leg: No edema.   Skin:     General: Skin is warm and dry.   Neurological:      General: No focal deficit present.      Mental Status: She is alert and oriented to person, place, and time.   Psychiatric:         Mood and Affect: Mood normal.         Behavior: Behavior normal.         Thought Content: Thought content normal.         Plan     Her fasting labs were reviewed with the patient from last week.     Diagnoses and all orders for this visit:    1. Hyperlipidemia LDL goal <100 (Primary)  Comments:  Chronic, improving with weight loss and lifestyle changes  Orders:  -     Comprehensive Metabolic Panel  -     Lipid Panel With / Chol / HDL Ratio    2. Morbid (severe) obesity due to excess calories  Comments:  On semaglutide compounded    3. Prediabetes  Comments:  Chronic, stable  Orders:  -     Hemoglobin A1c    4. Anxiolytic-induced insomnia  -     hydrOXYzine (ATARAX) 25 MG tablet; Take 1 tablet by mouth At Night As Needed for Anxiety.  Dispense: 90 tablet; Refill: 1    5. RLS (restless legs syndrome)  -     pramipexole (Mirapex) 0.5 MG tablet; Take 1 tablet by mouth 3  (Three) Times a Day.        Set up a CPE in 6 months w labs

## 2024-12-05 LAB
ALBUMIN SERPL-MCNC: 4.4 G/DL (ref 3.9–4.9)
ALP SERPL-CCNC: 74 IU/L (ref 44–121)
ALT SERPL-CCNC: 17 IU/L (ref 0–32)
AST SERPL-CCNC: 21 IU/L (ref 0–40)
BILIRUB SERPL-MCNC: 0.4 MG/DL (ref 0–1.2)
BUN SERPL-MCNC: 15 MG/DL (ref 8–27)
BUN/CREAT SERPL: 21 (ref 12–28)
CALCIUM SERPL-MCNC: 9.7 MG/DL (ref 8.7–10.3)
CHLORIDE SERPL-SCNC: 101 MMOL/L (ref 96–106)
CHOLEST SERPL-MCNC: 239 MG/DL (ref 100–199)
CHOLEST/HDLC SERPL: 3 RATIO (ref 0–4.4)
CO2 SERPL-SCNC: 24 MMOL/L (ref 20–29)
CREAT SERPL-MCNC: 0.71 MG/DL (ref 0.57–1)
EGFRCR SERPLBLD CKD-EPI 2021: 97 ML/MIN/1.73
GLOBULIN SER CALC-MCNC: 2.7 G/DL (ref 1.5–4.5)
GLUCOSE SERPL-MCNC: 81 MG/DL (ref 70–99)
HBA1C MFR BLD: 5.7 % (ref 4.8–5.6)
HDLC SERPL-MCNC: 79 MG/DL
LDLC SERPL CALC-MCNC: 143 MG/DL (ref 0–99)
POTASSIUM SERPL-SCNC: 4.4 MMOL/L (ref 3.5–5.2)
PROT SERPL-MCNC: 7.1 G/DL (ref 6–8.5)
SODIUM SERPL-SCNC: 140 MMOL/L (ref 134–144)
TRIGL SERPL-MCNC: 100 MG/DL (ref 0–149)
VLDLC SERPL CALC-MCNC: 17 MG/DL (ref 5–40)

## 2024-12-06 PROBLEM — G25.81 RLS (RESTLESS LEGS SYNDROME): Status: ACTIVE | Noted: 2024-12-06

## 2024-12-06 PROBLEM — R73.03 PREDIABETES: Status: ACTIVE | Noted: 2024-12-06

## 2024-12-18 DIAGNOSIS — G25.81 RLS (RESTLESS LEGS SYNDROME): ICD-10-CM

## 2024-12-20 RX ORDER — PRAMIPEXOLE DIHYDROCHLORIDE 0.5 MG/1
0.5 TABLET ORAL NIGHTLY PRN
Qty: 90 TABLET | Refills: 0
Start: 2024-12-20

## 2024-12-20 NOTE — TELEPHONE ENCOUNTER
CBC in past 12 months     Rx Refill Note  Requested Prescriptions     Pending Prescriptions Disp Refills    pramipexole (Mirapex) 0.5 MG tablet       Sig: Take 1 tablet by mouth 3 (Three) Times a Day.      Last office visit with prescribing clinician: 12/4/2024   Last telemedicine visit with prescribing clinician: Visit date not found   Next office visit with prescribing clinician: Visit date not found                         Would you like a call back once the refill request has been completed: [] Yes [] No    If the office needs to give you a call back, can they leave a voicemail: [] Yes [] No    Curtis Araujo MA  12/20/24, 09:29 EST

## 2024-12-30 DIAGNOSIS — G25.81 RLS (RESTLESS LEGS SYNDROME): ICD-10-CM

## 2024-12-30 RX ORDER — PRAMIPEXOLE DIHYDROCHLORIDE 0.5 MG/1
0.5 TABLET ORAL NIGHTLY PRN
Qty: 90 TABLET | Refills: 0
Start: 2024-12-30

## 2025-04-16 RX ORDER — ESOMEPRAZOLE MAGNESIUM 40 MG/1
40 CAPSULE, DELAYED RELEASE ORAL
Qty: 30 CAPSULE | Refills: 5 | Status: SHIPPED | OUTPATIENT
Start: 2025-04-16

## 2025-04-16 NOTE — TELEPHONE ENCOUNTER
Rx Refill Note  Requested Prescriptions     Pending Prescriptions Disp Refills    esomeprazole (nexIUM) 40 MG capsule 30 capsule 5     Sig: Take 1 capsule by mouth Every Morning Before Breakfast.      Last office visit with prescribing clinician: 12/4/2024   Last telemedicine visit with prescribing clinician: Visit date not found   Next office visit with prescribing clinician: Visit date not found                         Would you like a call back once the refill request has been completed: [] Yes [] No    If the office needs to give you a call back, can they leave a voicemail: [] Yes [] No    Teri Walton MA  04/16/25, 16:59 EDT

## 2025-05-23 DIAGNOSIS — F13.982 ANXIOLYTIC-INDUCED INSOMNIA: ICD-10-CM

## 2025-05-23 RX ORDER — HYDROXYZINE HYDROCHLORIDE 25 MG/1
25 TABLET, FILM COATED ORAL NIGHTLY PRN
Qty: 30 TABLET | Refills: 0 | Status: SHIPPED | OUTPATIENT
Start: 2025-05-23

## 2025-05-23 NOTE — TELEPHONE ENCOUNTER
No checkmarks available    Rx Refill Note  Requested Prescriptions     Pending Prescriptions Disp Refills    hydrOXYzine (ATARAX) 25 MG tablet 90 tablet 1     Sig: Take 1 tablet by mouth At Night As Needed for Anxiety.      Last office visit with prescribing clinician: 12/4/2024   Last telemedicine visit with prescribing clinician: Visit date not found   Next office visit with prescribing clinician: Visit date not found                         Would you like a call back once the refill request has been completed: [] Yes [] No    If the office needs to give you a call back, can they leave a voicemail: [] Yes [] No    Curtis Araujo MA  05/23/25, 08:07 EDT

## 2025-06-17 ENCOUNTER — OFFICE VISIT (OUTPATIENT)
Dept: ORTHOPEDIC SURGERY | Facility: CLINIC | Age: 60
End: 2025-06-17
Payer: COMMERCIAL

## 2025-06-17 VITALS
HEIGHT: 65 IN | WEIGHT: 199 LBS | HEART RATE: 64 BPM | DIASTOLIC BLOOD PRESSURE: 78 MMHG | SYSTOLIC BLOOD PRESSURE: 120 MMHG | BODY MASS INDEX: 33.15 KG/M2

## 2025-06-17 DIAGNOSIS — M75.51 SUBACROMIAL BURSITIS OF RIGHT SHOULDER JOINT: Primary | ICD-10-CM

## 2025-06-17 RX ORDER — LIDOCAINE HYDROCHLORIDE 10 MG/ML
4 INJECTION, SOLUTION EPIDURAL; INFILTRATION; INTRACAUDAL; PERINEURAL
Status: COMPLETED | OUTPATIENT
Start: 2025-06-17 | End: 2025-06-17

## 2025-06-17 RX ORDER — TRIAMCINOLONE ACETONIDE 40 MG/ML
80 INJECTION, SUSPENSION INTRA-ARTICULAR; INTRAMUSCULAR
Status: COMPLETED | OUTPATIENT
Start: 2025-06-17 | End: 2025-06-17

## 2025-06-17 RX ADMIN — TRIAMCINOLONE ACETONIDE 80 MG: 40 INJECTION, SUSPENSION INTRA-ARTICULAR; INTRAMUSCULAR at 11:35

## 2025-06-17 RX ADMIN — LIDOCAINE HYDROCHLORIDE 4 ML: 10 INJECTION, SOLUTION EPIDURAL; INFILTRATION; INTRACAUDAL; PERINEURAL at 11:35

## 2025-06-17 NOTE — PROGRESS NOTES
Subjective:     Patient ID: Ashlyn Gil is a 59 y.o. female.    Chief Complaint:  Right shoulder injury- new patient to examiner   History of Present Illness  History of Present Illness  The patient is a 59-year-old female who presents to the clinic today for evaluation of her right shoulder. She has completed x-ray imaging at an outside facility.    Approximately 3 weeks ago, she fell, catching herself with an outstretched right upper extremity and burning her arm. She presented shortly after to urgent care and was started on oral prednisone 60 mg for 5 days, which seemed to provide symptom improvement. However, she continues to exhibit pain along the anterior aspect of the shoulder, which radiates down into the biceps muscle. She describes the pain as shooting, aching, and burning in nature, rating it as an 8 out of 10. She also reports clicking, popping, and a sensation of giving way. Initially, she felt a pop on impact without any significant bruising at the upper extremity.    She is right-hand dominant. 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 She reports no numbness or tingling in the upper extremity and has no history of prior injury to the shoulder. The pain is exacerbated by reaching out to the side, reaching above her head, and sleeping at night, regardless of whether she is lying on her right or left shoulder. She also experiences morning stiffness but reports no other concerns. She has been taking meloxicam for the last three weeks.    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  Ashlyn Gil       Social History     Occupational History    Not on file   Tobacco Use    Smoking status: Never     Passive exposure: Never    Smokeless tobacco: Never   Vaping Use    Vaping status: Never Used   Substance and Sexual Activity    Alcohol use: Yes      Alcohol/week: 1.0 standard drink of alcohol     Types: 1 Cans of beer per week     Comment: occasional drink at dinner    Drug use: Never    Sexual activity: Not Currently     Birth control/protection: Post-menopausal      Past Medical History:   Diagnosis Date    Anxiety     Carpal tunnel syndrome     Cataract 6/2023    Left eye    Colon polyp     Depression     Diabetes mellitus, type 2     Diarrhea     Edema     Esophageal reflux     Esophagitis, reflux     Fatigue     Heartburn     Hemorrhoids     Hiatal hernia     History of bone density study 2010    Normal    History of colonoscopy 06/22/2012    Normal    History of esophagogastroduodenoscopy 06/22/2012    Olman Esophagus    History of mammogram 07/2012    Normal    Hypercholesterolemia     Hyperlipidemia     Insomnia     Malnutrition     Morbid obesity     Pain in soft tissues of limb     Knee    Plantar fasciitis     Poor vision     Prediabetes     Restless legs syndrome     Right upper quadrant pain     Sciatica     Sleep apnea     Wears glasses     Wrist joint pain     Bilateral     Past Surgical History:   Procedure Laterality Date    ADENOIDECTOMY  1970    BARIATRIC SURGERY  12/09/2013    Gastric Sleeve    BILATERAL BREAST REDUCTION  12/01/2010    BREAST SURGERY  11/2010    Breast reduction    CHOLECYSTECTOMY  11/12/2012    COLONOSCOPY      COLONOSCOPY N/A 02/27/2024    Procedure: COLONOSCOPY;  Surgeon: Leno Alonso MD;  Location: [facility] [place] MAIN OR;  Service: Gastroenterology;  Laterality: N/A;  Ascending Polyp, Diverticulosis    ENDOSCOPY      ENDOSCOPY N/A 02/27/2024    Procedure: ESOPHAGOGASTRODUODENOSCOPY;  Surgeon: Leno Alonso MD;  Location: [facility] [place] MAIN OR;  Service: Gastroenterology;  Laterality: N/A;  S/P Gastric Sleeve, Gastritis    EYE SURGERY      FRACTURE SURGERY  2011    broken right tibia    GASTRIC RESTRICTION SURGERY  12/09/2013    VGS, VERTICAL GASTRIC SLEEVE    HERNIA REPAIR  12/09/2013    Paraesophageal  "[place] Hernia    LEG SURGERY  2010    RIGHT LEG, ROSI AND SCREWS    PAP SMEAR  2012    Normal    TONSILLECTOMY  1970    TONSILLECTOMY AND ADENOIDECTOMY  1970    TUBAL ABDOMINAL LIGATION  1995       Family History   Problem Relation Age of Onset    Colon cancer Father     Cancer Father         Colon cancer    Diabetes Maternal Grandmother         Adult onset    Alzheimer's disease Paternal Grandmother     Other Paternal Grandmother         Alzheimer    Heart disease Paternal Grandfather          from cardiac arrest    Anxiety disorder Mother     Breast cancer Neg Hx                Objective:  Physical Exam    Vital signs reviewed.   General: No acute distress.  Eyes: conjunctiva clear; pupils equally round and reactive  ENT: external ears and nose atraumatic; oropharynx clear  CV: no peripheral edema  Resp: normal respiratory effort  Skin: no rashes or wounds; normal turgor  Psych: mood and affect appropriate; recent and remote memory intact    Vitals:    25 1108   BP: 120/78   Pulse: 64   Weight: 90.3 kg (199 lb)   Height: 165.1 cm (65\")         25  1108   Weight: 90.3 kg (199 lb)     Body mass index is 33.12 kg/m².      Right Shoulder Exam     Tenderness   The patient is experiencing tenderness in the biceps tendon and acromion.    Tests   Duenas test: positive  Cross arm: negative  Impingement: positive  Drop arm: negative    Other   Erythema: absent  Sensation: normal  Pulse: present             Physical Exam  Musculoskeletal:  Right shoulder: Passive forward flexion 170 degrees, passive shoulder abduction 90 degrees, external rotation 45 degrees, positive deltoid firing, maximal tenderness along the biceps tendon and lateral acromion, positive Duenas test, positive impingement test, negative drop arm exam, negative crossarm exam, negative apprehension test, negative empty can test, negative Speed's exam    Imaging:    XR Shoulder 2+ View Right  Result Date: 2025  1. No acute " process.   This report was finalized on 5/29/2025 4:02 PM by Dr. Nolan Guidry M.D on Workstation: [id number]      Independently reviewed xray imaging right shoulder- no evidence of acute fracture, dislocation or other acute osseous abnormality   Assessment:        1. Subacromial bursitis of right shoulder joint         Assessment & Plan  1. Right shoulder subacromial bursitis:    A corticosteroid injection will be administered to address the subacromial bursitis. It is advised to apply ice to the injection site this evening, alternating between 20 minutes on and 20 minutes off, and to continue this regimen tomorrow to alleviate discomfort. Meloxicam use will be continued for now. All questions were answered. If there is no improvement within 4 weeks, a follow-up clinic visit for reassessment will be necessary.    Follow-up: If no improvement within 4 weeks, return for reevaluation.    PROCEDURE  Corticosteroid injection for treatment of subacromial bursitis was performed today.    Plan:  - Large Joint Arthrocentesis: R subacromial bursa on 6/17/2025 11:35 AM  Indications: pain  Details: 22 G needle, lateral approach  Medications: 4 mL lidocaine PF 1% 1 %; 80 mg triamcinolone acetonide 40 MG/ML  Outcome: tolerated well, no immediate complications  Procedure, treatment alternatives, risks and benefits explained, specific risks discussed. Consent was given by the patient. Immediately prior to procedure a time out was called to verify the correct patient, procedure, equipment, support staff and site/side marked as required. Patient was prepped and draped in the usual sterile fashion.         Orders:  Orders Placed This Encounter   Procedures    - Large Joint Arthrocentesis: R subacromial bursa     No orders of the defined types were placed in this encounter.      Dragon dictation utilized          Patient or patient representative verbalized consent for the use of Ambient Listening during the visit with  Rebecca  MARVA Valdes for chart documentation. 6/17/2025  11:56 EDT

## 2025-06-19 ENCOUNTER — PATIENT ROUNDING (BHMG ONLY) (OUTPATIENT)
Dept: ORTHOPEDIC SURGERY | Facility: CLINIC | Age: 60
End: 2025-06-19
Payer: COMMERCIAL

## 2025-06-23 DIAGNOSIS — F13.982 ANXIOLYTIC-INDUCED INSOMNIA: ICD-10-CM

## 2025-06-23 RX ORDER — HYDROXYZINE HYDROCHLORIDE 25 MG/1
25 TABLET, FILM COATED ORAL NIGHTLY PRN
Qty: 30 TABLET | Refills: 0 | Status: CANCELLED | OUTPATIENT
Start: 2025-06-23

## 2025-06-24 DIAGNOSIS — F13.982 ANXIOLYTIC-INDUCED INSOMNIA: ICD-10-CM

## 2025-06-24 RX ORDER — HYDROXYZINE HYDROCHLORIDE 25 MG/1
25 TABLET, FILM COATED ORAL NIGHTLY PRN
Qty: 30 TABLET | Refills: 0 | Status: SHIPPED | OUTPATIENT
Start: 2025-06-24

## 2025-06-24 NOTE — TELEPHONE ENCOUNTER
No checkmarks available    Rx Refill Note  Requested Prescriptions     Pending Prescriptions Disp Refills    hydrOXYzine (ATARAX) 25 MG tablet 30 tablet 0     Sig: Take 1 tablet by mouth At Night As Needed for Anxiety.      Last office visit with prescribing clinician: 12/4/2024   Last telemedicine visit with prescribing clinician: Visit date not found   Next office visit with prescribing clinician: Visit date not found                         Would you like a call back once the refill request has been completed: [] Yes [] No    If the office needs to give you a call back, can they leave a voicemail: [] Yes [] No    Curtis Araujo MA  06/24/25, 15:21 EDT     [id number] Detailed

## 2025-07-24 DIAGNOSIS — F13.982 ANXIOLYTIC-INDUCED INSOMNIA: ICD-10-CM

## 2025-07-27 RX ORDER — HYDROXYZINE HYDROCHLORIDE 25 MG/1
25 TABLET, FILM COATED ORAL NIGHTLY PRN
Qty: 30 TABLET | Refills: 0 | OUTPATIENT
Start: 2025-07-27

## 2025-07-30 ENCOUNTER — TELEPHONE (OUTPATIENT)
Dept: INTERNAL MEDICINE | Facility: CLINIC | Age: 60
End: 2025-07-30
Payer: COMMERCIAL

## 2025-07-30 NOTE — TELEPHONE ENCOUNTER
Hub staff attempted to follow warm transfer process and was unsuccessful     Caller: Ashlyn Gil    Relationship to patient: Self    Best call back number: [phone]    Patient is needing: STATED THAT THEY ARE NEEDING TO SCHEDULE A LAB APPOINTMENT AHEAD OF THEIR APPOINTMENT ON 8/6/25. PLEASE CALL AND ADVISE

## 2025-08-01 DIAGNOSIS — E78.5 HYPERLIPIDEMIA LDL GOAL <100: ICD-10-CM

## 2025-08-01 DIAGNOSIS — E78.5 HYPERLIPIDEMIA LDL GOAL <100: Primary | ICD-10-CM

## 2025-08-01 DIAGNOSIS — R73.03 PREDIABETES: ICD-10-CM

## 2025-08-01 DIAGNOSIS — Z13.29 SCREENING FOR THYROID DISORDER: ICD-10-CM

## 2025-08-01 LAB
ALBUMIN SERPL-MCNC: 4.2 G/DL (ref 3.5–5.2)
ALBUMIN/GLOB SERPL: 1.8 G/DL
ALP SERPL-CCNC: 61 U/L (ref 39–117)
ALT SERPL-CCNC: 18 U/L (ref 1–33)
AST SERPL-CCNC: 18 U/L (ref 1–32)
BASOPHILS # BLD AUTO: 0.02 10*3/MM3 (ref 0–0.2)
BASOPHILS NFR BLD AUTO: 0.4 % (ref 0–1.5)
BILIRUB SERPL-MCNC: 0.4 MG/DL (ref 0–1.2)
BUN SERPL-MCNC: 12 MG/DL (ref 8–23)
BUN/CREAT SERPL: 15.6 (ref 7–25)
CALCIUM SERPL-MCNC: 9.4 MG/DL (ref 8.6–10.5)
CHLORIDE SERPL-SCNC: 103 MMOL/L (ref 98–107)
CHOLEST SERPL-MCNC: 219 MG/DL (ref 0–200)
CHOLEST/HDLC SERPL: 2.46 {RATIO}
CO2 SERPL-SCNC: 26.7 MMOL/L (ref 22–29)
CREAT SERPL-MCNC: 0.77 MG/DL (ref 0.57–1)
EGFRCR SERPLBLD CKD-EPI 2021: 87.9 ML/MIN/1.73
EOSINOPHIL # BLD AUTO: 0.13 10*3/MM3 (ref 0–0.4)
EOSINOPHIL NFR BLD AUTO: 2.4 % (ref 0.3–6.2)
ERYTHROCYTE [DISTWIDTH] IN BLOOD BY AUTOMATED COUNT: 12.8 % (ref 12.3–15.4)
GLOBULIN SER CALC-MCNC: 2.3 GM/DL
GLUCOSE SERPL-MCNC: 90 MG/DL (ref 65–99)
HBA1C MFR BLD: 5.1 % (ref 4.8–5.6)
HCT VFR BLD AUTO: 41.1 % (ref 34–46.6)
HDLC SERPL-MCNC: 89 MG/DL (ref 40–60)
HGB BLD-MCNC: 13.2 G/DL (ref 12–15.9)
IMM GRANULOCYTES # BLD AUTO: 0.02 10*3/MM3 (ref 0–0.05)
IMM GRANULOCYTES NFR BLD AUTO: 0.4 % (ref 0–0.5)
LDLC SERPL CALC-MCNC: 119 MG/DL (ref 0–100)
LYMPHOCYTES # BLD AUTO: 2.21 10*3/MM3 (ref 0.7–3.1)
LYMPHOCYTES NFR BLD AUTO: 40 % (ref 19.6–45.3)
MCH RBC QN AUTO: 28.2 PG (ref 26.6–33)
MCHC RBC AUTO-ENTMCNC: 32.1 G/DL (ref 31.5–35.7)
MCV RBC AUTO: 87.8 FL (ref 79–97)
MONOCYTES # BLD AUTO: 0.46 10*3/MM3 (ref 0.1–0.9)
MONOCYTES NFR BLD AUTO: 8.3 % (ref 5–12)
NEUTROPHILS # BLD AUTO: 2.69 10*3/MM3 (ref 1.7–7)
NEUTROPHILS NFR BLD AUTO: 48.5 % (ref 42.7–76)
NRBC BLD AUTO-RTO: 0 /100 WBC (ref 0–0.2)
PLATELET # BLD AUTO: 283 10*3/MM3 (ref 140–450)
POTASSIUM SERPL-SCNC: 4.1 MMOL/L (ref 3.5–5.2)
PROT SERPL-MCNC: 6.5 G/DL (ref 6–8.5)
RBC # BLD AUTO: 4.68 10*6/MM3 (ref 3.77–5.28)
SODIUM SERPL-SCNC: 140 MMOL/L (ref 136–145)
TRIGL SERPL-MCNC: 61 MG/DL (ref 0–150)
TSH SERPL DL<=0.005 MIU/L-ACNC: 1.03 UIU/ML (ref 0.27–4.2)
VLDLC SERPL CALC-MCNC: 11 MG/DL (ref 5–40)
WBC # BLD AUTO: 5.53 10*3/MM3 (ref 3.4–10.8)

## 2025-08-06 ENCOUNTER — OFFICE VISIT (OUTPATIENT)
Dept: INTERNAL MEDICINE | Facility: CLINIC | Age: 60
End: 2025-08-06
Payer: COMMERCIAL

## 2025-08-06 VITALS
SYSTOLIC BLOOD PRESSURE: 110 MMHG | HEIGHT: 65 IN | OXYGEN SATURATION: 96 % | WEIGHT: 192.2 LBS | HEART RATE: 80 BPM | BODY MASS INDEX: 32.02 KG/M2 | TEMPERATURE: 98.6 F | DIASTOLIC BLOOD PRESSURE: 68 MMHG

## 2025-08-06 DIAGNOSIS — G25.81 RLS (RESTLESS LEGS SYNDROME): ICD-10-CM

## 2025-08-06 DIAGNOSIS — Z13.6 ENCOUNTER FOR SCREENING FOR CORONARY ARTERY DISEASE: ICD-10-CM

## 2025-08-06 DIAGNOSIS — M75.51 SUBACROMIAL BURSITIS OF RIGHT SHOULDER JOINT: ICD-10-CM

## 2025-08-06 DIAGNOSIS — E78.5 HYPERLIPIDEMIA LDL GOAL <100: Primary | ICD-10-CM

## 2025-08-06 PROBLEM — M25.512 ACUTE PAIN OF LEFT SHOULDER: Status: RESOLVED | Noted: 2023-12-05 | Resolved: 2025-08-06

## 2025-08-06 PROCEDURE — 99214 OFFICE O/P EST MOD 30 MIN: CPT | Performed by: NURSE PRACTITIONER

## 2025-08-06 RX ORDER — PRAMIPEXOLE DIHYDROCHLORIDE 0.5 MG/1
0.5 TABLET ORAL NIGHTLY PRN
Qty: 90 TABLET | Refills: 1 | Status: SHIPPED | OUTPATIENT
Start: 2025-08-06

## 2025-08-06 RX ORDER — MELOXICAM 15 MG/1
15 TABLET ORAL DAILY PRN
Qty: 90 TABLET | Refills: 1 | Status: SHIPPED | OUTPATIENT
Start: 2025-08-06

## 2025-08-07 DIAGNOSIS — R73.03 PREDIABETES: ICD-10-CM

## 2025-08-07 DIAGNOSIS — E78.5 HYPERLIPIDEMIA LDL GOAL <100: Primary | ICD-10-CM

## 2025-08-07 DIAGNOSIS — K21.00 GASTROESOPHAGEAL REFLUX DISEASE WITH ESOPHAGITIS WITHOUT HEMORRHAGE: ICD-10-CM

## 2025-08-07 DIAGNOSIS — F13.982 ANXIOLYTIC-INDUCED INSOMNIA: ICD-10-CM

## 2025-08-07 RX ORDER — HYDROXYZINE HYDROCHLORIDE 25 MG/1
25 TABLET, FILM COATED ORAL NIGHTLY PRN
Qty: 30 TABLET | Refills: 0 | Status: SHIPPED | OUTPATIENT
Start: 2025-08-07

## (undated) DEVICE — CANN O2 ETCO2 FITS ALL CONN CO2 SMPL A/ 7IN DISP LF

## (undated) DEVICE — KT ORCA ORCAPOD DISP STRL

## (undated) DEVICE — BITEBLOCK OMNI BLOC

## (undated) DEVICE — FRCP BX RADJAW4 NDL 2.8 240CM LG OG BX80

## (undated) DEVICE — JACKT LAB F/R KNIT CUFF/COLR XLG BLU

## (undated) DEVICE — GOWN ISOL W/THUMB UNIV BLU BX/15

## (undated) DEVICE — ADAPT CLN SCPE ENDO PORPOISE BX/50 DISP

## (undated) DEVICE — Device

## (undated) DEVICE — VIAL FORMLN CAP 10PCT 20ML

## (undated) DEVICE — LINER CANSTR SXN MEDIVAC FLX/ADV 1500ML